# Patient Record
Sex: FEMALE | Race: WHITE | Employment: UNEMPLOYED | URBAN - METROPOLITAN AREA
[De-identification: names, ages, dates, MRNs, and addresses within clinical notes are randomized per-mention and may not be internally consistent; named-entity substitution may affect disease eponyms.]

---

## 2017-11-02 RX ORDER — SERTRALINE HYDROCHLORIDE 100 MG/1
TABLET, FILM COATED ORAL
Qty: 30 TAB | Refills: 0 | Status: SHIPPED | OUTPATIENT
Start: 2017-11-02 | End: 2017-11-08 | Stop reason: CLARIF

## 2017-11-08 ENCOUNTER — OFFICE VISIT (OUTPATIENT)
Dept: FAMILY MEDICINE CLINIC | Age: 32
End: 2017-11-08

## 2017-11-08 VITALS
SYSTOLIC BLOOD PRESSURE: 112 MMHG | HEIGHT: 61 IN | DIASTOLIC BLOOD PRESSURE: 69 MMHG | TEMPERATURE: 98.9 F | HEART RATE: 77 BPM | RESPIRATION RATE: 18 BRPM | BODY MASS INDEX: 23.22 KG/M2 | OXYGEN SATURATION: 98 % | WEIGHT: 123 LBS

## 2017-11-08 DIAGNOSIS — F41.9 ANXIETY: Primary | ICD-10-CM

## 2017-11-08 DIAGNOSIS — R19.7 DIARRHEA, UNSPECIFIED TYPE: ICD-10-CM

## 2017-11-08 RX ORDER — SERTRALINE HYDROCHLORIDE 50 MG/1
50 TABLET, FILM COATED ORAL DAILY
Qty: 15 TAB | Refills: 0 | Status: SHIPPED | OUTPATIENT
Start: 2017-11-08 | End: 2018-01-24

## 2017-11-08 RX ORDER — DULOXETIN HYDROCHLORIDE 30 MG/1
30 CAPSULE, DELAYED RELEASE ORAL DAILY
Qty: 30 CAP | Refills: 1 | Status: SHIPPED | OUTPATIENT
Start: 2017-11-08 | End: 2018-01-24 | Stop reason: SDUPTHER

## 2017-11-08 NOTE — PROGRESS NOTES
Chief Complaint   Patient presents with    Medication Refill     1. Have you been to the ER, urgent care clinic since your last visit? Hospitalized since your last visit? No    2. Have you seen or consulted any other health care providers outside of the [address] [place] [address] since your last visit? Include any pap smears or colon screening.  No

## 2017-11-08 NOTE — LETTER
11/10/2017 8:10 AM 
 
Ms. Jeanna Amin [address] Dear Jeanna Amin: 
 
Please find your most recent results below. Resulted Orders METABOLIC PANEL, COMPREHENSIVE Result Value Ref Range Glucose 77 65 - 99 mg/dL BUN 15 6 - 20 mg/dL Creatinine 1.09 (H) 0.57 - 1.00 mg/dL GFR est non-AA 68 >59 mL/min/1.73 GFR est AA 79 >59 mL/min/1.73  
 BUN/Creatinine ratio 14 9 - 23 Sodium 142 134 - 144 mmol/L Potassium 4.0 3.5 - 5.2 mmol/L Chloride 100 96 - 106 mmol/L  
 CO2 29 18 - 29 mmol/L Calcium 9.0 8.7 - 10.2 mg/dL Protein, total 7.0 6.0 - 8.5 g/dL Albumin 4.7 3.5 - 5.5 g/dL GLOBULIN, TOTAL 2.3 1.5 - 4.5 g/dL A-G Ratio 2.0 1.2 - 2.2 Bilirubin, total 0.2 0.0 - 1.2 mg/dL Alk. phosphatase 38 (L) 39 - 117 IU/L  
 AST (SGOT) 15 0 - 40 IU/L  
 ALT (SGPT) 11 0 - 32 IU/L Narrative Performed at:  [address]  [id number] : Shavonne Baker MD, Phone:  [phone] CBC W/O DIFF Result Value Ref Range WBC 6.1 3.4 - 10.8 x10E3/uL  
 RBC 4.41 3.77 - 5.28 x10E6/uL HGB 13.1 11.1 - 15.9 g/dL HCT 38.8 34.0 - 46.6 % MCV 88 79 - 97 fL  
 MCH 29.7 26.6 - 33.0 pg  
 MCHC 33.8 31.5 - 35.7 g/dL  
 RDW 12.8 12.3 - 15.4 % PLATELET 886 216 - 837 x10E3/uL Narrative Performed at:  [address]  [id number] : Shavonne Baker MD, Phone:  [phone] SED RATE (ESR) Result Value Ref Range Sed rate (ESR) 2 0 - 32 mm/hr Narrative Performed at:  [address]  [id number] : Shavonne Baker MD, Phone:  [phone] TSH 3RD GENERATION Result Value Ref Range TSH 1.190 0.450 - 4.500 uIU/mL Narrative Performed at:  [address]  [id number] : Shavonne Baker MD, Phone:  [phone] HEMOGLOBIN A1C WITH EAG Result Value Ref Range Hemoglobin A1c 5.2 4.8 - 5.6 % Comment:  
            Pre-diabetes: 5.7 - 6.4 Diabetes: >6.4 Glycemic control for adults with diabetes: <7.0 Estimated average glucose 103 mg/dL Narrative Performed at:  [address]  [id number] : Pili Carreno MD, Phone:  [phone] RECOMMENDATIONS: 
 
 
   
Only abnormality is slightly elevated kidney function -- implying that her kidneys are not filtering as well as they should; most commonly seen as a result of hypertension, but patient does not have HTN Would recommend rechecking this lab when patient is well hydrated Normal glucose Normal kidney function Normal liver function No anemia Normal inflammatory marker Normal thyroid function Normal glucose over the last 3 months Needs followup appointment after switch to cymbalta Please call me if you have any questions: [phone] Sincerely, Lady Argenis MD

## 2017-11-08 NOTE — PROGRESS NOTES
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     Had PAP smear done in [place] recently -- thinks it was done September 2017    Is on sertraline for depression -- has been on it since 2015 -- wants to see if another medication may be more helpful    Having trouble with low blood glucose she thinks     States that 2-3 hours after eating that she has shaking, heart beats fast -- blood glucose = 62 one time that she checked it     States that her dad's sister has diabetes    Has had \"stomach issues\" since last delivery -- had abdominal ultrasound, colonoscopy done -- both normal -- sister has IBS and patient has similar sxs    Taking zoloft 100 mg once a day    Also having pain in joints in fingers and other large joints as well    Another problem she stats is waking up during the night and throat is dry -- feels like she gasps for air; able to get air in; patient states that her  states that she snores    Discussed with patient that she had multiple complaints that needed to be addressed; could start with switching her antidepressant to cymbalta, which may be more helpful for her IBS sxs; will need to taper her sertraline and then start back with cymbalta -- gave patient rx for sertraliine 50 mg for two weeks, then to start cymbalta at 30 mg a day    Greater than 50% of this minute visit was counseling about anxiety, depression, possible hypoglycemia, antidepressants and IBS sxs.       Also will check labs today

## 2017-11-09 PROBLEM — K58.1 IRRITABLE BOWEL SYNDROME WITH CONSTIPATION: Status: ACTIVE | Noted: 2017-11-09

## 2017-11-09 LAB
ALBUMIN SERPL-MCNC: 4.7 G/DL (ref 3.5–5.5)
ALBUMIN/GLOB SERPL: 2 {RATIO} (ref 1.2–2.2)
ALP SERPL-CCNC: 38 IU/L (ref 39–117)
ALT SERPL-CCNC: 11 IU/L (ref 0–32)
AST SERPL-CCNC: 15 IU/L (ref 0–40)
BILIRUB SERPL-MCNC: 0.2 MG/DL (ref 0–1.2)
BUN SERPL-MCNC: 15 MG/DL (ref 6–20)
BUN/CREAT SERPL: 14 (ref 9–23)
CALCIUM SERPL-MCNC: 9 MG/DL (ref 8.7–10.2)
CHLORIDE SERPL-SCNC: 100 MMOL/L (ref 96–106)
CO2 SERPL-SCNC: 29 MMOL/L (ref 18–29)
CREAT SERPL-MCNC: 1.09 MG/DL (ref 0.57–1)
ERYTHROCYTE [DISTWIDTH] IN BLOOD BY AUTOMATED COUNT: 12.8 % (ref 12.3–15.4)
ERYTHROCYTE [SEDIMENTATION RATE] IN BLOOD BY WESTERGREN METHOD: 2 MM/HR (ref 0–32)
EST. AVERAGE GLUCOSE BLD GHB EST-MCNC: 103 MG/DL
GFR SERPLBLD CREATININE-BSD FMLA CKD-EPI: 68 ML/MIN/1.73
GFR SERPLBLD CREATININE-BSD FMLA CKD-EPI: 79 ML/MIN/1.73
GLOBULIN SER CALC-MCNC: 2.3 G/DL (ref 1.5–4.5)
GLUCOSE SERPL-MCNC: 77 MG/DL (ref 65–99)
HBA1C MFR BLD: 5.2 % (ref 4.8–5.6)
HCT VFR BLD AUTO: 38.8 % (ref 34–46.6)
HGB BLD-MCNC: 13.1 G/DL (ref 11.1–15.9)
MCH RBC QN AUTO: 29.7 PG (ref 26.6–33)
MCHC RBC AUTO-ENTMCNC: 33.8 G/DL (ref 31.5–35.7)
MCV RBC AUTO: 88 FL (ref 79–97)
PLATELET # BLD AUTO: 255 X10E3/UL (ref 150–379)
POTASSIUM SERPL-SCNC: 4 MMOL/L (ref 3.5–5.2)
PROT SERPL-MCNC: 7 G/DL (ref 6–8.5)
RBC # BLD AUTO: 4.41 X10E6/UL (ref 3.77–5.28)
SODIUM SERPL-SCNC: 142 MMOL/L (ref 134–144)
TSH SERPL DL<=0.005 MIU/L-ACNC: 1.19 UIU/ML (ref 0.45–4.5)
WBC # BLD AUTO: 6.1 X10E3/UL (ref 3.4–10.8)

## 2017-11-09 NOTE — PROGRESS NOTES
Only abnormality is slightly elevated kidney function -- implying that her kidneys are not filtering as well as they should; most commonly seen as a result of hypertension, but patient does not have HTN    Would recommend rechecking this lab when patient is well hydrated     Normal glucose  Normal kidney function  Normal liver function  No anemia  Normal inflammatory marker    Normal thyroid function    Normal glucose over the last 3 months    Needs followup appointment after switch to cymbalta

## 2017-11-28 DIAGNOSIS — R79.89 ELEVATED SERUM CREATININE: Primary | ICD-10-CM

## 2017-12-15 ENCOUNTER — LAB ONLY (OUTPATIENT)
Dept: FAMILY MEDICINE CLINIC | Age: 32
End: 2017-12-15

## 2017-12-15 DIAGNOSIS — R79.89 ABNORMAL LFTS: Primary | ICD-10-CM

## 2017-12-15 NOTE — LETTER
12/21/2017 8:11 AM 
 
Ms. Angela Hernandez [address] Dear Angela Hernandez: 
 
Please find your most recent results below. Resulted Orders METABOLIC PANEL, BASIC Result Value Ref Range Glucose 81 65 - 99 mg/dL BUN 13 6 - 20 mg/dL Creatinine 0.87 0.57 - 1.00 mg/dL GFR est non-AA 90 >59 mL/min/1.73 GFR est  >59 mL/min/1.73  
 BUN/Creatinine ratio 15 9 - 23 Sodium 139 134 - 144 mmol/L Potassium 4.3 3.5 - 5.2 mmol/L Chloride 100 96 - 106 mmol/L  
 CO2 25 18 - 29 mmol/L Calcium 9.9 8.7 - 10.2 mg/dL Narrative Performed at:  [address]  [id number] : Yudy Warren MD, Phone:  [phone] RECOMMENDATIONS: 
 
 
Repeat kidney function test is normal  
 
 
 
 
Please call me if you have any questions: [phone] Sincerely, [facility]

## 2017-12-15 NOTE — MR AVS SNAPSHOT
Visit Information Date & Time Provider Department Dept. Phone Encounter #  
 12/15/2017 11:15 AM [facility] [address] [phone] [id number] Upcoming Health Maintenance Date Due Influenza Age 5 to Adult 8/1/2017 PAP AKA CERVICAL CYTOLOGY 12/26/2017 DTaP/Tdap/Td series (2 - Td) 5/20/2025 Allergies as of 12/15/2017  Review Complete On: 11/9/2017 By: Garfield Waller MD  
 No Known Allergies Current Immunizations  Reviewed on 7/30/2015 Name Date Influenza Vaccine Split 10/11/2012 Tdap 5/20/2015 11:22 AM  
  
 Not reviewed this visit You Were Diagnosed With   
  
 Codes Comments Abnormal LFTs    -  Primary ICD-10-CM: R79.89 ICD-9-CM: 790.6 Vitals OB Status Smoking Status Having regular periods Never Smoker Preferred Pharmacy Pharmacy Name Phone [facility] [address] Your Updated Medication List  
  
   
This list is accurate as of: 12/15/17  4:40 PM.  Always use your most recent med list.  
  
  
  
  
 DULoxetine 30 mg capsule Commonly known as:  CYMBALTA Take 1 Cap by mouth daily. Indications: ANXIETY WITH DEPRESSION  
  
 polyethylene glycol 17 gram packet Commonly known as:  Belkys Hemanth Take 1 Packet by mouth daily. senna-docusate 8.6-50 mg per tablet Commonly known as:  Amelia Lipschutz Take 1 Tab by mouth daily. sertraline 50 mg tablet Commonly known as:  ZOLOFT Take 1 Tab by mouth daily. We Performed the Following METABOLIC PANEL, BASIC [78706 CPT(R)] Introducing [facility]! Dear Roxanna Harper: Thank you for requesting a Hapzing account. Our records indicate that you already have an active Hapzing account. You can access your account anytime at [email or web address] Did you know that you can access your hospital and ER discharge instructions at any time in Dayforce? You can also review all of your test results from your hospital stay or ER visit. Additional Information If you have questions, please visit the Frequently Asked Questions section of the Dayforce website at [email or web address] Remember, Dayforce is NOT to be used for urgent needs. For medical emergencies, dial 911. Now available from your iPhone and Android! Please provide this summary of care documentation to your next provider. Your primary care clinician is listed as [address] [place] [address]. If you have any questions after today's visit, please call [phone].

## 2017-12-16 LAB
BUN SERPL-MCNC: 13 MG/DL (ref 6–20)
BUN/CREAT SERPL: 15 (ref 9–23)
CALCIUM SERPL-MCNC: 9.9 MG/DL (ref 8.7–10.2)
CHLORIDE SERPL-SCNC: 100 MMOL/L (ref 96–106)
CO2 SERPL-SCNC: 25 MMOL/L (ref 18–29)
CREAT SERPL-MCNC: 0.87 MG/DL (ref 0.57–1)
GFR SERPLBLD CREATININE-BSD FMLA CKD-EPI: 103 ML/MIN/1.73
GFR SERPLBLD CREATININE-BSD FMLA CKD-EPI: 90 ML/MIN/1.73
GLUCOSE SERPL-MCNC: 81 MG/DL (ref 65–99)
POTASSIUM SERPL-SCNC: 4.3 MMOL/L (ref 3.5–5.2)
SODIUM SERPL-SCNC: 139 MMOL/L (ref 134–144)

## 2017-12-18 ENCOUNTER — TELEPHONE (OUTPATIENT)
Dept: FAMILY MEDICINE CLINIC | Age: 32
End: 2017-12-18

## 2017-12-18 ENCOUNTER — OFFICE VISIT (OUTPATIENT)
Dept: FAMILY MEDICINE CLINIC | Age: 32
End: 2017-12-18

## 2017-12-18 VITALS
TEMPERATURE: 98.5 F | WEIGHT: 124 LBS | RESPIRATION RATE: 16 BRPM | HEART RATE: 71 BPM | SYSTOLIC BLOOD PRESSURE: 104 MMHG | OXYGEN SATURATION: 99 % | DIASTOLIC BLOOD PRESSURE: 69 MMHG | BODY MASS INDEX: 23.43 KG/M2

## 2017-12-18 DIAGNOSIS — K62.5 BLOOD PER RECTUM: Primary | ICD-10-CM

## 2017-12-18 LAB
HEMOCCULT STL QL: ABNORMAL
HGB BLD-MCNC: 11.8 G/DL
VALID INTERNAL CONTROL?: YES

## 2017-12-18 NOTE — PROGRESS NOTES
Steven Luciano is a 29 y.o. female    1. Have you been to the ER, urgent care clinic since your last visit? Hospitalized since your last visit?no    2. Have you seen or consulted any other health care providers outside of the [address] [place] [address] since your last visit? Include any pap smears or colon screening.  Yes  at . Yue Lopez [address] [place] [address]

## 2017-12-18 NOTE — PATIENT INSTRUCTIONS
You will receive a call from the Gastroenterologist to schedule your appointment to be seen for your hemorrhoids and rectal bleeding. We recommend that you take daily fiber to help with your symptoms of diarrhea. You can purchase metamucil over the counter. Ask your local pharmacy for other options. Follow-up in 2 weeks. Irritable Bowel Syndrome: Care Instructions  Your Care Instructions  Irritable bowel syndrome, or IBS, is a problem with the intestines that causes belly pain, bloating, gas, constipation, and diarrhea. The cause of IBS is not well known. IBS can last for many years, but it does not get worse over time or lead to serious disease. Most people can control their symptoms by changing their diet and reducing stress. Follow-up care is a key part of your treatment and safety. Be sure to make and go to all appointments, and call your doctor if you are having problems. It's also a good idea to know your test results and keep a list of the medicines you take. How can you care for yourself at home? · For constipation:  ¨ Include fruits, vegetables, beans, and whole grains in your diet each day. These foods are high in fiber. ¨ Drink plenty of fluids, enough so that your urine is light yellow or clear like water. If you have kidney, heart, or liver disease and have to limit fluids, talk with your doctor before you increase the amount of fluids you drink. ¨ Get some exercise every day. Build up slowly to 30 to 60 minutes a day on 5 or more days of the week. ¨ Take a fiber supplement, such as Citrucel or Metamucil, every day if needed. Read and follow all instructions on the label. ¨ Schedule time each day for a bowel movement. Having a daily routine may help. Take your time and do not strain when having a bowel movement. · If you often have diarrhea, limit foods and drinks that make it worse.  These are different for each person but may include caffeine (found in coffee, tea, chocolate, and cola drinks), alcohol, fatty foods, gas-producing foods (such as beans, cabbage, and broccoli), some dairy products, and spicy foods. Do not eat candy or gum that contains sorbitol. · Keep a daily diary of what you eat and what symptoms you have. This may help find foods that cause you problems. · Eat slowly. Try to make mealtime relaxing. · Find ways to reduce stress. · Get at least 30 minutes of exercise on most days of the week. Exercise can help reduce tension and prevent constipation. Walking is a good choice. You also may want to do other activities, such as running, swimming, cycling, or playing tennis or team sports. When should you call for help? Call your doctor now or seek immediate medical care if:  ? · Your pain is different than usual or occurs with fever. ? · You lose weight without trying, or you lose your appetite and you do not know why.   ? · Your symptoms often wake you from sleep. ? · Your stools are black and tarlike or have streaks of blood. ? Watch closely for changes in your health, and be sure to contact your doctor if:  ? · Your IBS symptoms get worse or begin to disrupt your day-to-day life. ? · You become more tired than usual.   ? · Your home treatment stops working. Where can you learn more? Go to http://geoffrey-jessy.info/. Enter R460 in the search box to learn more about \"Irritable Bowel Syndrome: Care Instructions. \"  Current as of: May 12, 2017  Content Version: 11.4  © 0285-6075 Maxpanda SaaS Software. Care instructions adapted under license by ONE Change (which disclaims liability or warranty for this information). If you have questions about a medical condition or this instruction, always ask your healthcare professional. [address] any warranty or liability for your use of this information.

## 2017-12-18 NOTE — TELEPHONE ENCOUNTER
Patient called for Dr. Past[facility] nurse to give her advice per her [de-identified] e-mail. .  Advised her to make an appointment and she accepted for today.

## 2017-12-18 NOTE — MR AVS SNAPSHOT
Visit Information Date & Time Provider Department Dept. Phone Encounter #  
 12/18/2017  4:00 PM Hollie Felty, MD [address] [place] [address] [phone] [id number] Follow-up Instructions Return in about 2 weeks (around 1/1/2018) for follow-up. Upcoming Health Maintenance Date Due Influenza Age 5 to Adult 8/1/2017 PAP AKA CERVICAL CYTOLOGY 12/26/2017 DTaP/Tdap/Td series (2 - Td) 5/20/2025 Allergies as of 12/18/2017  Review Complete On: 11/9/2017 By: Maggie Cook MD  
 No Known Allergies Current Immunizations  Reviewed on 7/30/2015 Name Date Influenza Vaccine Split 10/11/2012 Tdap 5/20/2015 11:22 AM  
  
 Not reviewed this visit You Were Diagnosed With   
  
 Codes Comments Blood per rectum    -  Primary ICD-10-CM: K62.5 ICD-9-CM: 569.3 Vitals BP Pulse Temp Resp Weight(growth percentile) SpO2  
 104/69 (BP 1 Location: Right arm, BP Patient Position: Sitting) 71 98.5 °F (36.9 °C) (Oral) 16 124 lb (56.2 kg) 99% BMI OB Status Smoking Status 23.43 kg/m2 Having regular periods Never Smoker BMI and BSA Data Body Mass Index Body Surface Area  
 23.43 kg/m 2 1.56 m 2 Preferred Pharmacy Pharmacy Name Phone [facility] [address] [place] Your Updated Medication List  
  
   
This list is accurate as of: 12/18/17  5:05 PM.  Always use your most recent med list.  
  
  
  
  
 DULoxetine 30 mg capsule Commonly known as:  CYMBALTA Take 1 Cap by mouth daily. Indications: ANXIETY WITH DEPRESSION  
  
 polyethylene glycol 17 gram packet Commonly known as:  Duane [place] Take 1 Packet by mouth daily. senna-docusate 8.6-50 mg per tablet Commonly known as:  Roselinda Chana Take 1 Tab by mouth daily. sertraline 50 mg tablet Commonly known as:  ZOLOFT Take 1 Tab by mouth daily. We Performed the Following AMB POC BLOOD OCCULT QUAL FECAL HEMGLBN 1-3 [id number] CPT(R)] CBC W/O DIFF [40154 CPT(R)] REFERRAL TO GASTROENTEROLOGY [PQS34 Custom] Comments:  
 Colonoscopy for gross rectal bleeding. FOBT positive. Hemorrhoids present but not actively bleeding. Follow-up Instructions Return in about 2 weeks (around 1/1/2018) for follow-up. Referral Information Referral ID Referred By Referred To  
  
 [id number] [address]   
   [address]  [place], [address] [facility] Visits Status Start Date End Date 1 New Request 12/18/17 12/18/18 If your referral has a status of pending review or denied, additional information will be sent to support the outcome of this decision. Patient Instructions You will receive a call from the Gastroenterologist to schedule your appointment to be seen for your hemorrhoids and rectal bleeding. We recommend that you take daily fiber to help with your symptoms of diarrhea. You can purchase metamucil over the counter. Ask your local pharmacy for other options. Follow-up in 2 weeks. Irritable Bowel Syndrome: Care Instructions Your Care Instructions Irritable bowel syndrome, or IBS, is a problem with the intestines that causes belly pain, bloating, gas, constipation, and diarrhea. The cause of IBS is not well known. IBS can last for many years, but it does not get worse over time or lead to serious disease. Most people can control their symptoms by changing their diet and reducing stress. Follow-up care is a key part of your treatment and safety. Be sure to make and go to all appointments, and call your doctor if you are having problems. It's also a good idea to know your test results and keep a list of the medicines you take. How can you care for yourself at home?  
· For constipation: 
¨ Include fruits, vegetables, beans, and whole grains in your diet each day. These foods are high in fiber. ¨ Drink plenty of fluids, enough so that your urine is light yellow or clear like water. If you have kidney, heart, or liver disease and have to limit fluids, talk with your doctor before you increase the amount of fluids you drink. ¨ Get some exercise every day. Build up slowly to 30 to 60 minutes a day on 5 or more days of the week. ¨ Take a fiber supplement, such as Citrucel or Metamucil, every day if needed. Read and follow all instructions on the label. ¨ Schedule time each day for a bowel movement. Having a daily routine may help. Take your time and do not strain when having a bowel movement. · If you often have diarrhea, limit foods and drinks that make it worse. These are different for each person but may include caffeine (found in coffee, tea, chocolate, and cola drinks), alcohol, fatty foods, gas-producing foods (such as beans, cabbage, and broccoli), some dairy products, and spicy foods. Do not eat candy or gum that contains sorbitol. · Keep a daily diary of what you eat and what symptoms you have. This may help find foods that cause you problems. · Eat slowly. Try to make mealtime relaxing. · Find ways to reduce stress. · Get at least 30 minutes of exercise on most days of the week. Exercise can help reduce tension and prevent constipation. Walking is a good choice. You also may want to do other activities, such as running, swimming, cycling, or playing tennis or team sports. When should you call for help? Call your doctor now or seek immediate medical care if: 
? · Your pain is different than usual or occurs with fever. ? · You lose weight without trying, or you lose your appetite and you do not know why.  
? · Your symptoms often wake you from sleep. ? · Your stools are black and tarlike or have streaks of blood. ? Watch closely for changes in your health, and be sure to contact your doctor if: ? · Your IBS symptoms get worse or begin to disrupt your day-to-day life. ? · You become more tired than usual.  
? · Your home treatment stops working. Where can you learn more? Go to http://geoffrey-jessy.info/. Enter C889 in the search box to learn more about \"Irritable Bowel Syndrome: Care Instructions. \" Current as of: May 12, 2017 Content Version: 11.4 © 2578-0489 YOOWALK. Care instructions adapted under license by Apportable (which disclaims liability or warranty for this information). If you have questions about a medical condition or this instruction, always ask your healthcare professional. [place] [address] any warranty or liability for your use of this information. Introducing [facility]! Dear Yanely Barron: Thank you for requesting a V[employer] account. Our records indicate that you already have an active [employer] account. You can access your account anytime at [email or web address] Did you know that you can access your hospital and ER discharge instructions at any time in V[employer]ome [employer]? You can also review all of your test results from your hospital stay or ER visit. Additional Information If you have questions, please visit the Frequently Asked Questions section of the [employer] website at [email or web address] Remember, V[employer] is NOT to be used for urgent needs. For medical emergencies, dial 911. Now available from your iPhone and Android! Please provide this summary of care documentation to your next provider. Your primary care clinician is listed as [address] [place] [address]. If you have any questions after today's visit, please call [phone].

## 2017-12-18 NOTE — PROGRESS NOTES
I reviewed with the resident the medical history and the resident's findings on the physical examination. I discussed with the resident the patient's diagnosis and concur with the plan.   Follow up HGB

## 2017-12-18 NOTE — PROGRESS NOTES
Subjective  CC: Yeni Abbasi is an 29 y.o. female presents for rectal bleeding and abdominal cramping. Has self-diagnosed IBS since 2 years ago. Predominately dirrheal type. Had colonoscopy done in [place] with out-of network about 1-2 years ago. She believes it was normal. She also had an ultrasound out in [facility] 139 for nausea. She has had hemorrhoids previously. But Thursday she noticed increase in blood. She initially thought it was an episode similar to what she had in May but the blood only lasted for about a day before it improved. At that time it was self-limiting. She has been going to the bathroom about 5 time a day which is her normal with IBS. Stool is usually watery with occasional really soft, small caliber stools. She sees blood when she wipes but now she is noting blood when she has flatus that is present in toilet bowl (present in picture on phone)    She is also having some lower abdominal cramping. It usually occurs before she has to go to the bathroom. Denies nausea or vomiting. No fevers. No changes in eating. Denies fatigue, dizziness, SOB, or chest pain. Allergies - reviewed:   No Known Allergies      Medications - reviewed:   Current Outpatient Prescriptions   Medication Sig    DULoxetine (CYMBALTA) 30 mg capsule Take 1 Cap by mouth daily. Indications: ANXIETY WITH DEPRESSION    sertraline (ZOLOFT) 50 mg tablet Take 1 Tab by mouth daily.  senna-docusate (PERICOLACE) 8.6-50 mg per tablet Take 1 Tab by mouth daily.  polyethylene glycol (MIRALAX) 17 gram packet Take 1 Packet by mouth daily. No current facility-administered medications for this visit.           Past Medical History - reviewed:  Past Medical History:   Diagnosis Date    Infertility     Psychiatric problem     depression on meds now, anorexia in past         Immunizations - reviewed:   Immunization History   Administered Date(s) Administered    Influenza Vaccine Split 10/11/2012    Tdap 05/20/2015 ROS  Review of Systems : A complete review of systems was performed and is negative except for those mentioned in the HPI. Physical Exam  Visit Vitals    /69 (BP 1 Location: Right arm, BP Patient Position: Sitting)    Pulse 71    Temp 98.5 °F (36.9 °C) (Oral)    Resp 16    Wt 124 lb (56.2 kg)    SpO2 99%    BMI 23.43 kg/m2       General appearance - Alert, NAD. Head: Atraumatic. Normocephalic. No lymphadenopathy  Eyes: EOMI. Sclera white. Respiratory - LCTAB. No wheeze/rale/rhonchi  Heart - Normal rate, regular rhythm. No m/r/r  Abdomen - Soft, mild tenderness to palpation of right lower quadrant. Non distended. Active bowel sounds. Neurological - No focal deficits. Speech normal.   Musculoskeletal - Normal ROM, Gait normal.    Extremities - No LE edema. Distal pulses intact  Skin - normal coloration, no palor and normal turgor. No cyanosis, no rash. Rectum - present of 2 hemorrhoids, one significantly larger than the other. Nontender. No visible blood present. No internal hemorrhoid present on rectal exam.     Labs  POC hgb - 11.8  FOBT - positive      Assessment/Plan  1. Blood per rectum - Concerns include IBD vs internal hemorrhoid vs bleeding colon polyp. FOBT was positive for blood although non visible at anus. Lab is currently closed so AMB POC Hbg ordered was 11.8 with the last Hbg on 11/8/17 to be 13.1. Pt is not showing signs of symptomatic anemia and is not tachycardiac on exam today. Bleeding could be from internal hemorrhoids as patient does strain at times with IBS. Discussed warning signs when patient should proceed to ER with rectal bleeding including fatigue, dizziness SOB, pre-syncope or if bleeding worsens.   - CBC W/O DIFF  - AMB POC BLOOD OCCULT QUAL FECAL HEMGLBN 1-3  - REFERRAL TO GASTROENTEROLOGY  - AMB POC HEMOGLOBIN (HGB)  - Start daily fiber supplementation to help with IBS    Follow-up Disposition:  Return in about 2 weeks (around 1/1/2018) for follow-up. Or sooner if needed      I have discussed the aforementioned diagnoses and plan with the patient in detail. I have provided information in person and/or in AVS. All questions answered prior to discharge.     Vitor Chen MD  Family Medicine Resident  PGY 1

## 2018-01-25 ENCOUNTER — TELEPHONE (OUTPATIENT)
Dept: FAMILY MEDICINE CLINIC | Age: 33
End: 2018-01-25

## 2018-01-25 DIAGNOSIS — F41.8 DEPRESSION WITH ANXIETY: Primary | ICD-10-CM

## 2018-01-25 RX ORDER — SERTRALINE HYDROCHLORIDE 50 MG/1
50 TABLET, FILM COATED ORAL DAILY
Qty: 30 TAB | Refills: 3 | Status: SHIPPED | OUTPATIENT
Start: 2018-01-25 | End: 2018-02-06 | Stop reason: SDUPTHER

## 2018-01-25 NOTE — TELEPHONE ENCOUNTER
Patient calling for follow up to [employer] message that she sent 1/21/18    Per nurse Simeon Tamez, she will have MD review and patient will be contacted today    Conversation: Prescription Question   (Oldest Message First)   Janice Osullivan   to Alisson Clements MD           1/21/18 2:55 Michell Worrell. im really wanting to switch back to the antidepressant I was on before cymbalta. Since being on cymbalta I've had some of the depression return and my family is starting to notice a difference in me. I know we had quit sertraline due to stomach issues but I'm going to a GI doctor now for that, and looks like it IBS. Any way I'd rather have gut issues then mind issues! Also, I've had terrible ringing in my ear! Is that a side effect from cymbalta? If there's any way I could get back on sertraline soon would be good.  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 on a trip to [place] to in a couple of weeks, so it'd be nice to have this behind me if possible!! Thankyou so much

## 2018-01-26 NOTE — TELEPHONE ENCOUNTER
Called patient and informed her of medication switch per Dr. Marguerite Marc. Patient verbalized understanding.

## 2018-02-06 DIAGNOSIS — F41.8 DEPRESSION WITH ANXIETY: Primary | ICD-10-CM

## 2018-02-06 RX ORDER — SERTRALINE HYDROCHLORIDE 100 MG/1
100 TABLET, FILM COATED ORAL DAILY
Qty: 30 TAB | Refills: 6 | Status: SHIPPED | OUTPATIENT
Start: 2018-02-06 | End: 2018-11-26 | Stop reason: SDUPTHER

## 2018-10-03 ENCOUNTER — OFFICE VISIT (OUTPATIENT)
Dept: FAMILY MEDICINE CLINIC | Age: 33
End: 2018-10-03

## 2018-10-03 VITALS
RESPIRATION RATE: 16 BRPM | HEIGHT: 61 IN | TEMPERATURE: 98.5 F | HEART RATE: 73 BPM | WEIGHT: 131 LBS | DIASTOLIC BLOOD PRESSURE: 58 MMHG | SYSTOLIC BLOOD PRESSURE: 92 MMHG | OXYGEN SATURATION: 100 % | BODY MASS INDEX: 24.73 KG/M2

## 2018-10-03 DIAGNOSIS — N92.6 MISSED MENSES: Primary | ICD-10-CM

## 2018-10-03 DIAGNOSIS — Z34.80 SUPERVISION OF OTHER NORMAL PREGNANCY, ANTEPARTUM: ICD-10-CM

## 2018-10-03 LAB
HCG URINE, QL. (POC): POSITIVE
VALID INTERNAL CONTROL?: YES

## 2018-10-03 RX ORDER — PROMETHAZINE HYDROCHLORIDE 12.5 MG/1
12.5 TABLET ORAL
Qty: 30 TAB | Refills: 3 | Status: SHIPPED | OUTPATIENT
Start: 2018-10-03 | End: 2018-10-19 | Stop reason: RX

## 2018-10-03 NOTE — PROGRESS NOTES
Subjective:   Tim Duval is a 34 y.o. female who is being seen today for possible pregnancy due to missed menses. LMP 18   She is sexually active with  and is not using contraception. [id number]  Hx : yes with first,  with last 2  Hx complications in prior pregnancies: no  Taking PNV: yes  Regular periods/certain LMP: yes  Very sick, nauseated constantly. Vomiting 1-3 times per day. Was like this with previous pregnancies. Has tried Zofran she had and       Allergies- reviewed:   No Known Allergies      Medications- reviewed:   Current Outpatient Prescriptions   Medication Sig    CALCIUM-MAGNESIUM PO Take  by mouth.  PNV No12-Iron-FA-DSS-OM-3 29 mg iron-1 mg -50 mg CPKD Take  by mouth.  promethazine (PHENERGAN) 12.5 mg tablet Take 1 Tab by mouth every six (6) hours as needed for Nausea.  sertraline (ZOLOFT) 100 mg tablet Take 1 Tab by mouth daily.  senna-docusate (PERICOLACE) 8.6-50 mg per tablet Take 1 Tab by mouth daily.  polyethylene glycol (MIRALAX) 17 gram packet Take 1 Packet by mouth daily. No current facility-administered medications for this visit. Past Medical History- reviewed:  Past Medical History:   Diagnosis Date    IBS (irritable bowel syndrome)     Infertility     Psychiatric problem     depression on meds now, anorexia in past       Past Surgical History- reviewed:   Past Surgical History:   Procedure Laterality Date     DELIVERY ONLY       for macrosomia(-)    HX HEMORRHOIDECTOMY           Social History- reviewed:  Social History     Social History    Marital status:      Spouse name: N/A    Number of children: N/A    Years of education: N/A     Occupational History    Not on file.      Social History Main Topics    Smoking status: Never Smoker    Smokeless tobacco: Never Used    Alcohol use No    Drug use: No    Sexual activity: Yes     Partners: Male     Birth control/ protection: None     Other Topics Concern    Not on file     Social History Narrative         Immunizations- reviewed:   Immunization History   Administered Date(s) Administered    Influenza Vaccine Split 10/11/2012    Tdap 2015     ROS:  GI: No abdominal pain, nausea, vomiting  : No vaginal bleeding, some sharp low pelvic stabbing pain but no cramping      Objective:     Visit Vitals    BP 92/58 (BP 1 Location: Left arm, BP Patient Position: Sitting)    Pulse 73    Temp 98.5 °F (36.9 °C) (Oral)    Resp 16    Ht 5' 1\" (1.549 m)    Wt 131 lb (59.4 kg)    LMP 2018 (Approximate)    SpO2 100%    BMI 24.75 kg/m2       Physical Exam:  GENERAL APPEARANCE: alert, well appearing, in no apparent distress  PSYCH: normal mood and affect    Labs:  Urine pregnancy test pos   Recent Results (from the past 12 hour(s))   AMB POC URINE PREGNANCY TEST, VISUAL COLOR COMPARISON    Collection Time: 10/03/18  3:10 PM   Result Value Ref Range    VALID INTERNAL CONTROL POC Yes     HCG urine, Ql. (POC) Positive Negative         Assessment:       ICD-10-CM ICD-9-CM    1. Missed menses N92.6 626.4 AMB POC URINE PREGNANCY TEST, VISUAL COLOR COMPARISON   2. Supervision of other normal pregnancy, antepartum Z34.80 V22.1 promethazine (PHENERGAN) 12.5 mg tablet         Plan:   34yo  @ 8.0 by LMP  Pos pregnancy test  Phenergan for n/v   F/u for IOB with dating ultrasound   Hx of section with 2 subsequent successful       Orders Placed This Encounter    AMB POC URINE PREGNANCY TEST, VISUAL COLOR COMPARISON    CALCIUM-MAGNESIUM PO     Sig: Take  by mouth.  PNV No12-Iron-FA-DSS-OM-3 29 mg iron-1 mg -50 mg CPKD     Sig: Take  by mouth.  promethazine (PHENERGAN) 12.5 mg tablet     Sig: Take 1 Tab by mouth every six (6) hours as needed for Nausea. Dispense:  30 Tab     Refill:  3         I have discussed the diagnosis with the patient and the intended plan as seen in the above orders.   The patient has received an after-visit summary and questions were answered concerning future plans. I have discussed medication side effects and warnings with the patient as well. Informed pt to return to the office or go to the ER if she experiences vaginal bleeding, vaginal discharge, leaking of fluid, pelvic cramping.       Jesse Wu, DO

## 2018-10-03 NOTE — PROGRESS NOTES
Chief Complaint   Patient presents with    Missed Menses     1. Have you been to the ER, urgent care clinic since your last visit? Hospitalized since your last visit? No    2. Have you seen or consulted any other health care providers outside of the [address] [place] [address] since your last visit? Include any pap smears or colon screening.  No

## 2018-10-19 ENCOUNTER — HOSPITAL ENCOUNTER (OUTPATIENT)
Dept: LAB | Age: 33
Discharge: HOME | End: 2018-10-19
Payer: SELF-PAY

## 2018-10-19 ENCOUNTER — ROUTINE PRENATAL (OUTPATIENT)
Dept: FAMILY MEDICINE CLINIC | Age: 33
End: 2018-10-19

## 2018-10-19 VITALS
WEIGHT: 134.8 LBS | DIASTOLIC BLOOD PRESSURE: 56 MMHG | BODY MASS INDEX: 25.45 KG/M2 | OXYGEN SATURATION: 99 % | HEIGHT: 61 IN | SYSTOLIC BLOOD PRESSURE: 88 MMHG | RESPIRATION RATE: 17 BRPM | TEMPERATURE: 98.1 F | HEART RATE: 76 BPM

## 2018-10-19 DIAGNOSIS — Z34.91 PRENATAL CARE IN FIRST TRIMESTER: Primary | ICD-10-CM

## 2018-10-19 LAB
ANTIBODY SCREEN, EXTERNAL: NEGATIVE
BILIRUB UR QL STRIP: NEGATIVE
CHLAMYDIA, EXTERNAL: NEGATIVE
GLUCOSE UR-MCNC: NEGATIVE MG/DL
GRBS, EXTERNAL: NORMAL
HBSAG, EXTERNAL: NEGATIVE
HIV, EXTERNAL: NONREACTIVE
KETONES P FAST UR STRIP-MCNC: NEGATIVE MG/DL
N. GONORRHEA, EXTERNAL: NEGATIVE
PH UR STRIP: 7 [PH] (ref 4.6–8)
PROT UR QL STRIP: NEGATIVE
RUBELLA, EXTERNAL: NORMAL
SP GR UR STRIP: 1.02 (ref 1–1.03)
T. PALLIDUM, EXTERNAL: NEGATIVE
TYPE, ABO & RH, EXTERNAL: NORMAL
UA UROBILINOGEN AMB POC: NORMAL (ref 0.2–1)
URINALYSIS CLARITY POC: CLEAR
URINALYSIS COLOR POC: YELLOW
URINE BLOOD POC: NEGATIVE
URINE LEUKOCYTES POC: NEGATIVE
URINE NITRITES POC: NEGATIVE

## 2018-10-19 PROCEDURE — 87491 CHLMYD TRACH DNA AMP PROBE: CPT | Performed by: STUDENT IN AN ORGANIZED HEALTH CARE EDUCATION/TRAINING PROGRAM

## 2018-10-19 PROCEDURE — 87624 HPV HI-RISK TYP POOLED RSLT: CPT | Performed by: STUDENT IN AN ORGANIZED HEALTH CARE EDUCATION/TRAINING PROGRAM

## 2018-10-19 PROCEDURE — 88175 CYTOPATH C/V AUTO FLUID REDO: CPT | Performed by: STUDENT IN AN ORGANIZED HEALTH CARE EDUCATION/TRAINING PROGRAM

## 2018-10-19 RX ORDER — PROMETHAZINE HYDROCHLORIDE 12.5 MG/1
12.5 SUPPOSITORY RECTAL
Qty: 12 SUPPOSITORY | Refills: 3 | Status: SHIPPED | OUTPATIENT
Start: 2018-10-19 | End: 2018-10-22 | Stop reason: ALTCHOICE

## 2018-10-19 NOTE — PROGRESS NOTES
Chief Complaint   Patient presents with    Initial Prenatal Visit     1. Have you been to the ER, urgent care clinic since your last visit? Hospitalized since your last visit? No    2. Have you seen or consulted any other health care providers outside of the [facility] since your last visit? Include any pap smears or colon screening.  No      DAVID: 5/15/2019    LMP: 2018        First one was a c section all other were vaginal deliveries- no complications with any of them--full term with all children    Declined flu vaccine

## 2018-10-19 NOTE — PROGRESS NOTES
OB Dating Ultrasound    Patient is a 34 y.o.  with an estimated gestational age of 8w4d by LMP who presents for dating ultrasound. [facility]  OFFICE PROCEDURE PROGRESS NOTE    Chart reviewed for the following:   Jesse HUNTER DO , have reviewed the History, Physical and updated the Allergic reactions for [address] performed immediately prior to start of procedure:   Jesse HUNTER DO , have performed the following reviews on [facility] prior to the start of the procedure:            * Patient was identified by name and date of birth   * Agreement on procedure being performed was verified  * Risks and Benefits explained to the patient  * Procedure site verified and marked as necessary  * Patient was positioned for comfort  * Consent was signed and verified     Time: 10:55  Date of procedure: 10/19/2018  Procedure performed by: Jesse Wu DO  How tolerated by patient: tolerated the procedure well with no complications    Ultrasound type:  Transabdominal  Findings: single IUP with +cardiac activity, fetus active. Placenta location: posterior  Fluid: grossly normal     GA by LMP: 10w2d  GA by AUA: 11w2d    DAVID by ultrasound today is consistent with DAVID by LMP.  Keep DAVID to: 05/15/2019    Escobar Henley MD

## 2018-10-20 ENCOUNTER — TELEPHONE (OUTPATIENT)
Dept: FAMILY MEDICINE CLINIC | Age: 33
End: 2018-10-20

## 2018-10-20 NOTE — PROGRESS NOTES
Return OB Visit       Subjective:   Mervat Hernandez 34 y.o.   DAVID: 5/15/2019, by Last Menstrual Period that correlates with 10w2d ultrasound  GA:  10w2d. Pregnancy complicated by history of  section. States she does not have abdominal pain  , chest pain, contractions and headache . She continues to present with nausea and vomiting, Unisom and B6 were previously prescribed but have not resolved the symptoms. Diet: well balanced, healthy   Water intake: adequate   Prenatal Vitamins: taking      She denies vaginal bleeding, discharge or loss of fluid. She denies nausea, vomiting, severe abdominal pain or cramping. She denies dysuria. She denies headaches, dizziness or vision changes. She denies excessive swelling of extremities. Past Medical History - Reviewed today  Patient Active Problem List   Diagnosis Code    Anorexia R63.0    Depression with anxiety F41.8    Urinary urgency R39.15    Polydipsia R63.1    Varicose veins of both lower extremities I83.93    Irritable bowel syndrome with constipation K58.1         Medications - Reviewed today  Current Outpatient Medications   Medication Sig Dispense Refill    promethazine (PHENERGAN) 12.5 mg suppository Insert 1 Suppository into rectum every six (6) hours as needed for Nausea for up to 7 days. 12 Suppository 3    CALCIUM-MAGNESIUM PO Take  by mouth.  PNV No12-Iron-FA-DSS-OM-3 29 mg iron-1 mg -50 mg CPKD Take  by mouth.  sertraline (ZOLOFT) 100 mg tablet Take 1 Tab by mouth daily. 30 Tab 6    senna-docusate (PERICOLACE) 8.6-50 mg per tablet Take 1 Tab by mouth daily. 30 Tab 1    polyethylene glycol (MIRALAX) 17 gram packet Take 1 Packet by mouth daily.  30 Packet 3         Allergies - Reviewed today  No Known Allergies      Family History - Reviewed today  Family History   Problem Relation Age of Onset    Asthma Brother     Hypertension Maternal Grandfather          Social History - Reviewed today  Social History Socioeconomic History    Marital status:      Spouse name: Not on file    Number of children: Not on file    Years of education: Not on file    Highest education level: Not on file   Social Needs    Financial resource strain: Not on file    Food insecurity - worry: Not on file    Food insecurity - inability: Not on file    Transportation needs - medical: Not on file   [employer] needs - non-medical: Not on file   Occupational History    Not on file   Tobacco Use    Smoking status: Never Smoker    Smokeless tobacco: Never Used   Substance and Sexual Activity    Alcohol use: No    Drug use: No    Sexual activity: Yes     Partners: Male     Birth control/protection: None   Other Topics Concern    Not on file   Social History Narrative    Not on file         Health Maintenance - Reviewed today   Immunizations:     -Influenza: last one administered on 10/11/2012     -Tdap:  in 3rd trimester     Screening:     -Pap smear: due      Objective:     Visit Vitals  BP (!) 88/56   Pulse 76   Temp 98.1 °F (36.7 °C) (Oral)   Resp 17   Ht 5' 1\" (1.549 m)   Wt 134 lb 12.8 oz (61.1 kg)   LMP 08/08/2018 (Approximate)   SpO2 99%   BMI 25.47 kg/m²       Physical Exam:  GENERAL APPEARANCE: alert, well appearing, in no apparent distress  LUNGS: clear to auscultation, no wheezes, rales or rhonchi, symmetric air entry  HEART: regular rate and rhythm, no murmurs  ABDOMEN: FHT present on ultrasound  BACK: no CVA tenderness  EXTREMITIES: no redness or tenderness in the calves or thighs, no edema  NEUROLOGICAL: alert, oriented, normal speech, no focal findings or movement disorder noted  PELVIC: normal external genitalia, vulva, vagina, cervix, uterus and adnexa. (Chaperoned by R[facility])      Assessment         ICD-10-CM ICD-9-CM    1.  Prenatal care in first trimester Z34.91 V22.1 AMB POC URINALYSIS DIP STICK AUTO W/O MICRO      CBC WITH AUTOMATED DIFF      BLOOD TYPE, (ABO+RH)      ANTIBODY SCREEN      PAP IG,CT-NG, APTIMA HPV AND RFX 16/18,45 ([id number])      RUBELLA AB, IGG      T PALLIDUM AB      HIV 1/2 AG/AB, 4TH GENERATION,W RFLX CONFIRM      CULTURE, URINE      HEP B SURFACE AG      CANCELED: PRENATAL PANEL I/WITHOUT HBSAG   This is a 33 y/o  at 10w2d by LMP that correlates with 10w2d ultrasound DAVID: 5/15/2019 with hx of previous  section followed by two subsequent successful        Plan     1. Hyperemesis gravidarum: Persistent N/V despite B6 and Unisom. - promethazine (PHENEGRAN) 12.5 mg suppository q6 prn  -Will not prescribe phenegran tablet given increased risk of serotonin syndrome given patient is on zoloft. 2. SIUP at  [facility]   - U/A WNL  - Prenatal labs ordered  -PAP smear performed  - 2nd trimester screen for Down's Syndrome discussed  - First trimester dating ultrasound performed. - Ultrasound for fetal Anomalies form faxed. - Follow up in 4 weeks  -NEEDS FLU VACCINE    Orders Placed This Encounter    CULTURE, URINE    CBC WITH AUTOMATED DIFF    RUBELLA AB, IGG    T PALLIDUM AB    HIV 1/2 AG/AB, 4TH GENERATION,W RFLX CONFIRM    HEP B SURFACE AG    AMB POC URINALYSIS DIP STICK AUTO W/O MICRO    BLOOD TYPE, (ABO+RH)    ANTIBODY SCREEN    promethazine (PHENERGAN) 12.5 mg suppository     Sig: Insert 1 Suppository into rectum every six (6) hours as needed for Nausea for up to 7 days. Dispense:  12 Suppository     Refill:  3    PAP IG,CT-NG, APTIMA HPV AND RFX 85/80,96 ([id number])     Order Specific Question:   Pap Source? Answer:   Endocervical     Order Specific Question:   Total Hysterectomy? Answer:   No     Order Specific Question:   Supracervical Hysterectomy? Answer:   No     Order Specific Question:   Post Menopausal?     Answer:   No     Order Specific Question:   Hormone Therapy? Answer:   No     Order Specific Question:   IUD? Answer:   No     Order Specific Question:   Abnormal Bleeding?      Answer:   No     Order Specific Question:   Pregnant     Answer:   Yes     Order Specific Question:   Post Partum? Answer:   No       I have discussed the diagnosis with the patient and the intended plan as seen in the above orders. The patient has received an after-visit summary and questions were answered concerning future plans. I have discussed medication side effects and warnings with the patient as well.     Patient examined by Dr. Carley Richey MD  Family Medicine Resident

## 2018-10-20 NOTE — TELEPHONE ENCOUNTER
Called patient and informed her Dr. Raul Desir sent in nausea medication to pharmacy. Advised her if it does not work to call back and let us know. Patient verbalized understanding.

## 2018-10-22 ENCOUNTER — TELEPHONE (OUTPATIENT)
Dept: FAMILY MEDICINE CLINIC | Age: 33
End: 2018-10-22

## 2018-10-22 DIAGNOSIS — O21.9 NAUSEA/VOMITING IN PREGNANCY: Primary | ICD-10-CM

## 2018-10-22 DIAGNOSIS — O23.40 GROUP B STREPTOCOCCUS URINARY TRACT INFECTION AFFECTING PREGNANCY, ANTEPARTUM: Primary | ICD-10-CM

## 2018-10-22 DIAGNOSIS — B95.1 GROUP B STREPTOCOCCUS URINARY TRACT INFECTION AFFECTING PREGNANCY, ANTEPARTUM: Primary | ICD-10-CM

## 2018-10-22 LAB
BACTERIA UR CULT: ABNORMAL
BACTERIA UR CULT: ABNORMAL

## 2018-10-22 RX ORDER — MECLIZINE HCL 12.5 MG 12.5 MG/1
12.5 TABLET ORAL
Qty: 30 TAB | Refills: 3 | Status: SHIPPED | OUTPATIENT
Start: 2018-10-22 | End: 2018-11-01

## 2018-10-22 RX ORDER — AMOXICILLIN 500 MG/1
500 CAPSULE ORAL 2 TIMES DAILY
Qty: 14 CAP | Refills: 0 | Status: SHIPPED | OUTPATIENT
Start: 2018-10-22 | End: 2018-10-29

## 2018-10-22 NOTE — TELEPHONE ENCOUNTER
Called patient and informed her of antibiotics sent to pharmacy for UTI. Patient verbalized understanding.

## 2018-10-22 NOTE — TELEPHONE ENCOUNTER
----- Message from Gisella Ward DO sent at 10/22/2018 10:58 AM EDT -----  Will send in abx for GBS UTI in pregnancy. [facility], please inform patient.

## 2018-10-23 LAB
ABO GROUP BLD: NORMAL
BASOPHILS # BLD AUTO: 0 X10E3/UL (ref 0–0.2)
BASOPHILS NFR BLD AUTO: 0 %
BLD GP AB SCN SERPL QL: NEGATIVE
C TRACH RRNA SPEC QL NAA+PROBE: NEGATIVE
EOSINOPHIL # BLD AUTO: 0.2 X10E3/UL (ref 0–0.4)
EOSINOPHIL NFR BLD AUTO: 2 %
ERYTHROCYTE [DISTWIDTH] IN BLOOD BY AUTOMATED COUNT: 12.9 % (ref 12.3–15.4)
HBV SURFACE AG SERPL QL IA: NEGATIVE
HCT VFR BLD AUTO: 38.8 % (ref 34–46.6)
HGB BLD-MCNC: 12.7 G/DL (ref 11.1–15.9)
HIV 1+2 AB+HIV1 P24 AG SERPL QL IA: NON REACTIVE
IMM GRANULOCYTES # BLD: 0.1 X10E3/UL (ref 0–0.1)
IMM GRANULOCYTES NFR BLD: 1 %
LYMPHOCYTES # BLD AUTO: 1.9 X10E3/UL (ref 0.7–3.1)
LYMPHOCYTES NFR BLD AUTO: 18 %
MCH RBC QN AUTO: 29.6 PG (ref 26.6–33)
MCHC RBC AUTO-ENTMCNC: 32.7 G/DL (ref 31.5–35.7)
MCV RBC AUTO: 90 FL (ref 79–97)
MONOCYTES # BLD AUTO: 0.8 X10E3/UL (ref 0.1–0.9)
MONOCYTES NFR BLD AUTO: 7 %
N GONORRHOEA RRNA SPEC QL NAA+PROBE: NEGATIVE
NEUTROPHILS # BLD AUTO: 7.9 X10E3/UL (ref 1.4–7)
NEUTROPHILS NFR BLD AUTO: 72 %
PLATELET # BLD AUTO: 274 X10E3/UL (ref 150–379)
RBC # BLD AUTO: 4.29 X10E6/UL (ref 3.77–5.28)
RH BLD: POSITIVE
RUBV IGG SERPL IA-ACNC: 6.92 INDEX
SPECIMEN SOURCE: NORMAL
T PALLIDUM AB SER QL IA: NEGATIVE
WBC # BLD AUTO: 10.8 X10E3/UL (ref 3.4–10.8)

## 2018-10-23 NOTE — PROGRESS NOTES
I saw and evaluated the patient, performing the key elements of the service. I discussed the findings, assessment and plan with the resident and agree with the resident's findings and plan as documented in the resident's note. 32yo [id number] @ 10.2 by LMP=10.2 wk sono   1. IUP: IOB labs with pap this visit, dating ultrasound consistent with dates (exactly 7 days off, not greater than, will correlate with MFM anatomy scan), declines aneuploidy screening this pregnancy, need to fill out episode history in full at next visit. 2.  NVP: has tried Zofran ODT, promethazine PO, B6 and Unisom without luck. Still vomiting 2-3 times most days, but is gaining weight, does not appear hypovolemic. Trial of phenergan suppository. Cannot do Reglan due to interaction with Zoloft. 3.  Hx section with 2 subsequent : plan will be for TOLAC this pregnancy. 4.  Depression: stable on Zoloft 100mg daily, on this for a long time including previous pregnancies. Recommend close f/u PP for depression screening.

## 2018-10-25 LAB
SPECIMEN STATUS REPORT, ROLRST: NORMAL
VZV IGG SER IA-ACNC: 186 INDEX

## 2018-11-21 ENCOUNTER — ROUTINE PRENATAL (OUTPATIENT)
Dept: FAMILY MEDICINE CLINIC | Age: 33
End: 2018-11-21

## 2018-11-21 VITALS
OXYGEN SATURATION: 99 % | SYSTOLIC BLOOD PRESSURE: 94 MMHG | HEIGHT: 61 IN | TEMPERATURE: 98.5 F | WEIGHT: 146 LBS | RESPIRATION RATE: 16 BRPM | DIASTOLIC BLOOD PRESSURE: 57 MMHG | HEART RATE: 82 BPM | BODY MASS INDEX: 27.56 KG/M2

## 2018-11-21 DIAGNOSIS — F41.9 ANXIETY AND DEPRESSION: ICD-10-CM

## 2018-11-21 DIAGNOSIS — O26.892 PREGNANCY HEADACHE IN SECOND TRIMESTER: ICD-10-CM

## 2018-11-21 DIAGNOSIS — Z3A.15 15 WEEKS GESTATION OF PREGNANCY: Primary | ICD-10-CM

## 2018-11-21 DIAGNOSIS — R51.9 PREGNANCY HEADACHE IN SECOND TRIMESTER: ICD-10-CM

## 2018-11-21 DIAGNOSIS — F32.A ANXIETY AND DEPRESSION: ICD-10-CM

## 2018-11-21 DIAGNOSIS — R82.71 GBS BACTERIURIA: ICD-10-CM

## 2018-11-21 DIAGNOSIS — Z98.891 HISTORY OF VAGINAL BIRTH AFTER CESAREAN: ICD-10-CM

## 2018-11-21 LAB
BILIRUB UR QL STRIP: NEGATIVE
GLUCOSE UR-MCNC: NEGATIVE MG/DL
KETONES P FAST UR STRIP-MCNC: NEGATIVE MG/DL
PH UR STRIP: 7 [PH] (ref 4.6–8)
PROT UR QL STRIP: NEGATIVE
SP GR UR STRIP: 1.01 (ref 1–1.03)
UA UROBILINOGEN AMB POC: NORMAL (ref 0.2–1)
URINALYSIS CLARITY POC: CLEAR
URINALYSIS COLOR POC: YELLOW
URINE BLOOD POC: NEGATIVE
URINE LEUKOCYTES POC: NEGATIVE
URINE NITRITES POC: NEGATIVE

## 2018-11-21 RX ORDER — ACETAMINOPHEN 500 MG
TABLET ORAL
COMMUNITY
End: 2022-04-06

## 2018-11-21 RX ORDER — BUTALBITAL, ACETAMINOPHEN AND CAFFEINE 50; 325; 40 MG/1; MG/1; MG/1
1 TABLET ORAL
Qty: 30 TAB | Refills: 0 | Status: SHIPPED | OUTPATIENT
Start: 2018-11-21 | End: 2021-07-14

## 2018-11-21 NOTE — PROGRESS NOTES
Return OB Visit       Subjective:   Tabby Nguyen 34 y.o.  15w0d. DAVID: 5/15/2019, by Last Menstrual Period      Reports good FM, no VB, LOF or contractions. Vomiting improved, about once a week now. Using Rx on and off. HA x2 weeks, non stop. Improves a little with tylenol. Dx with migraines in the past.  Not the worse HA of her life. Worse with light and sound. In a previous pregnancy she remembers having to take something prescribed for headaches because they got so intense. Doesn't remember exactly what it was. Objective:   BP 94/57 (BP 1 Location: Left arm, BP Patient Position: Sitting)   Pulse 82   Temp 98.5 °F (36.9 °C) (Oral)   Resp 16   Ht 5' 1\" (1.549 m)   Wt 146 lb (66.2 kg)   LMP 2018 (Approximate)   SpO2 99%   BMI 27.59 kg/m²     Physical Exam:  SEE FLOWSHEET    Labs  No results found for this or any previous visit (from the past 12 hour(s)). Assessment       ICD-10-CM ICD-9-CM    1. 15 weeks gestation of pregnancy Z3A.15 V22.2 AMB POC URINALYSIS DIP STICK AUTO W/O MICRO   2. Pregnancy headache in second trimester O26.892 646.83 butalbital-acetaminophen-caffeine (FIORICET, ESGIC) -40 mg per tablet    R51 784.0    3. Anxiety and depression F41.9 300.00     F32.9 311    4. GBS bacteriuria R82.71 599.0      041.02    5. History of vaginal birth after  Z98.891 V13.29      Plan   34yo [id number] @ 15w0d by LMP=10.2 wk sono   1. IUP: IOB labs reviewed, dating ultrasound consistent with dates (exactly 7 days off, not greater than, will correlate with [facility] anatomy scan ), declines aneuploidy screening this pregnancy. GBS pos urine. 2.  NVP: slowly improving. Weight gain is good. 3.  Hx section with 2 subsequent : plan will be for TOLAC this pregnancy. 4.  Depression: stable on Zoloft 100mg daily, on this for a long time including previous pregnancies. Recommend close f/u PP for depression screening.   5.  Headache: dx with migraines previously. Will try magnesium daily (400-800mg) and fioricet, which she has had to use in a prior pregnancy. 6.  UTI: needs MT at next appt       Orders Placed This Encounter    AMB POC URINALYSIS DIP STICK AUTO W/O MICRO    acetaminophen (TYLENOL EXTRA STRENGTH) 500 mg tablet     Sig: Take  by mouth every six (6) hours as needed for Pain.  butalbital-acetaminophen-caffeine (FIORICET, ESGIC) -40 mg per tablet     Sig: Take 1 Tab by mouth every six (6) hours as needed for Pain. Dispense:  30 Tab     Refill:  0         Labor precautions discussed, including: Regular painful contractions, lasting for greater than one hour, taking your breath away; any vaginal bleeding; any leakage of fluid; or absent or decreased fetal movement. Call M.D. on call if any of these symptoms or signs occur. I have discussed the diagnosis with the patient and the intended plan as seen in the above orders. The patient has received an after-visit summary and questions were answered concerning future plans. I have discussed medication side effects and warnings with the patient as well. Informed pt to return to the office or go to the ER if she experiences vaginal bleeding, vaginal discharge, leaking of fluid, pelvic cramping.       Jesse Wu, DO

## 2018-11-21 NOTE — PROGRESS NOTES
Chief Complaint   Patient presents with    Routine Prenatal Visit     1. Have you been to the ER, urgent care clinic since your last visit? Hospitalized since your last visit? No    2. Have you seen or consulted any other health care providers outside of the [address] [place] [address] since your last visit? Include any pap smears or colon screening. No      Patient denies any leakage of fluid or abnormal vaginal bleeding. Patient states she is having fetal movement, and is not having any pain. Patient states she is still taking prenatal vitamins.

## 2018-11-26 RX ORDER — SERTRALINE HYDROCHLORIDE 100 MG/1
100 TABLET, FILM COATED ORAL DAILY
Qty: 30 TAB | Refills: 6 | Status: CANCELLED | OUTPATIENT
Start: 2018-11-26

## 2018-11-27 RX ORDER — SERTRALINE HYDROCHLORIDE 100 MG/1
TABLET, FILM COATED ORAL
Qty: 30 TAB | Refills: 6 | Status: SHIPPED | OUTPATIENT
Start: 2018-11-27 | End: 2019-03-06 | Stop reason: SDUPTHER

## 2018-12-17 ENCOUNTER — HOSPITAL ENCOUNTER (OUTPATIENT)
Dept: PERINATAL CARE | Age: 33
Discharge: HOME | End: 2018-12-17
Attending: OBSTETRICS & GYNECOLOGY
Payer: SELF-PAY

## 2018-12-17 PROCEDURE — 76811 OB US DETAILED SNGL FETUS: CPT | Performed by: OBSTETRICS & GYNECOLOGY

## 2019-01-07 ENCOUNTER — ROUTINE PRENATAL (OUTPATIENT)
Dept: FAMILY MEDICINE CLINIC | Age: 34
End: 2019-01-07

## 2019-01-07 VITALS
SYSTOLIC BLOOD PRESSURE: 90 MMHG | WEIGHT: 152 LBS | OXYGEN SATURATION: 98 % | DIASTOLIC BLOOD PRESSURE: 55 MMHG | RESPIRATION RATE: 16 BRPM | HEART RATE: 89 BPM | BODY MASS INDEX: 28.7 KG/M2 | HEIGHT: 61 IN | TEMPERATURE: 98.2 F

## 2019-01-07 DIAGNOSIS — Z3A.21 21 WEEKS GESTATION OF PREGNANCY: Primary | ICD-10-CM

## 2019-01-07 LAB
BILIRUB UR QL STRIP: NEGATIVE
GLUCOSE UR-MCNC: NEGATIVE MG/DL
KETONES P FAST UR STRIP-MCNC: NORMAL MG/DL
PH UR STRIP: 5.5 [PH] (ref 4.6–8)
PROT UR QL STRIP: NEGATIVE
SP GR UR STRIP: 1.02 (ref 1–1.03)
UA UROBILINOGEN AMB POC: NORMAL (ref 0.2–1)
URINALYSIS CLARITY POC: CLEAR
URINALYSIS COLOR POC: YELLOW
URINE BLOOD POC: NEGATIVE
URINE LEUKOCYTES POC: NEGATIVE
URINE NITRITES POC: NEGATIVE

## 2019-01-07 RX ORDER — ZINC GLUCONATE 10 MG
LOZENGE ORAL
COMMUNITY

## 2019-01-07 NOTE — PROGRESS NOTES
Identified Patient with two Patient identifiers (Name and ). Two Patient Identifiers confirmed. Reviewed record in preparation for visit and have obtained necessary documentation. Chief Complaint   Patient presents with    Routine Prenatal Visit     21w5d       Visit Vitals  BP 90/55 (BP 1 Location: Left arm, BP Patient Position: Sitting)   Pulse 89   Temp 98.2 °F (36.8 °C) (Oral)   Resp 16   Ht 5' 1\" (1.549 m)   Wt 152 lb (68.9 kg)   SpO2 98%   BMI 28.72 kg/m²       1. Have you been to the ER, urgent care clinic since your last visit? Hospitalized since your last visit? No    2. Have you seen or consulted any other health care providers outside of the [address] [place] [address] since your last visit? Include any pap smears or colon screening.  No     + fetal movement - patient denies any vaginal bleeding, d/c, or LOF

## 2019-01-07 NOTE — PROGRESS NOTES
Return OB Visit       Subjective:   Michela Lassiter 34 y.o.  21w5d. DAVID: 5/15/2019, by Last Menstrual Period      Reports good FM, no VB, LOF or contractions. Vomiting improved, about once every two weeks. Using Rx on and off. HA much better after increasing magnesium dose. Some B/L LE swelling, worse in the evenings. Sometimes in hands. Thinks she eats a lot of salt. Also with varicose vein on R calf. Similar to previous pregnancy. Objective:   BP 90/55 (BP 1 Location: Left arm, BP Patient Position: Sitting)   Pulse 89   Temp 98.2 °F (36.8 °C) (Oral)   Resp 16   Ht 5' 1\" (1.549 m)   Wt 152 lb (68.9 kg)   LMP 2018 (Approximate)   SpO2 98%   BMI 28.72 kg/m²     Physical Exam:  Ext: B/L LE without swelling or edema. R calf with varicose vein, very mildly tender to palp, no calf erythema or tenderness    Labs  Recent Results (from the past 12 hour(s))   AMB POC URINALYSIS DIP STICK AUTO W/O MICRO    Collection Time: 19  2:29 PM   Result Value Ref Range    Color (UA POC) Yellow     Clarity (UA POC) Clear     Glucose (UA POC) Negative Negative    Bilirubin (UA POC) Negative Negative    Ketones (UA POC) Trace Negative    Specific gravity (UA POC) 1.025 1.001 - 1.035    Blood (UA POC) Negative Negative    pH (UA POC) 5.5 4.6 - 8.0    Protein (UA POC) Negative Negative    Urobilinogen (UA POC) 0.2 mg/dL 0.2 - 1    Nitrites (UA POC) Negative Negative    Leukocyte esterase (UA POC) Negative Negative         Assessment       ICD-10-CM ICD-9-CM    1. 21 weeks gestation of pregnancy Z3A.21 V22.2 AMB POC URINALYSIS DIP STICK AUTO W/O MICRO     Plan   32yo [id number] @ 21w5d by LMP=10.2 wk sono   1. IUP: IOB labs reviewed, declines aneuploidy screening this pregnancy. GBS pos urine. 2.  NVP: slowly improving. Weight gain is good. 3.  Hx section with 2 subsequent : plan will be for TOLAC this pregnancy.   4.  Depression and hx anorexia: stable on Zoloft 100mg daily, on this for a long time including previous pregnancies. Recommend close f/u PP for depression screening. 5.  Headache: dx with migraines previously. Will try magnesium daily (400-800mg) and fioricet prn, which she has had to use in a prior pregnancy. 6.  UTI: MT today   7. Varicose vein: R calf, no concern for DVT on my exam today, discussed tylenol for pain. Low threshold for doppler if changes. Orders Placed This Encounter    AMB POC URINALYSIS DIP STICK AUTO W/O MICRO    magnesium 250 mg tab     Sig: Take  by mouth. Labor precautions discussed, including: Regular painful contractions, lasting for greater than one hour, taking your breath away; any vaginal bleeding; any leakage of fluid; or absent or decreased fetal movement. Call M.D. on call if any of these symptoms or signs occur. I have discussed the diagnosis with the patient and the intended plan as seen in the above orders. The patient has received an after-visit summary and questions were answered concerning future plans. I have discussed medication side effects and warnings with the patient as well. Informed pt to return to the office or go to the ER if she experiences vaginal bleeding, vaginal discharge, leaking of fluid, pelvic cramping.       Jesse Wu, DO

## 2019-01-09 LAB — BACTERIA UR CULT: NORMAL

## 2019-02-11 ENCOUNTER — ROUTINE PRENATAL (OUTPATIENT)
Dept: FAMILY MEDICINE CLINIC | Age: 34
End: 2019-02-11

## 2019-02-11 VITALS
BODY MASS INDEX: 26.24 KG/M2 | HEIGHT: 61 IN | TEMPERATURE: 97.5 F | RESPIRATION RATE: 16 BRPM | SYSTOLIC BLOOD PRESSURE: 112 MMHG | HEART RATE: 83 BPM | WEIGHT: 139 LBS | DIASTOLIC BLOOD PRESSURE: 72 MMHG | OXYGEN SATURATION: 99 %

## 2019-02-11 DIAGNOSIS — Z3A.26 26 WEEKS GESTATION OF PREGNANCY: Primary | ICD-10-CM

## 2019-02-11 LAB
ANTIBODY SCREEN, EXTERNAL: NEGATIVE
BILIRUB UR QL STRIP: NEGATIVE
GLUCOSE UR-MCNC: NEGATIVE MG/DL
KETONES P FAST UR STRIP-MCNC: NEGATIVE MG/DL
PH UR STRIP: 6.5 [PH] (ref 4.6–8)
PROT UR QL STRIP: NEGATIVE
SP GR UR STRIP: 1.01 (ref 1–1.03)
UA UROBILINOGEN AMB POC: NORMAL (ref 0.2–1)
URINALYSIS CLARITY POC: CLEAR
URINALYSIS COLOR POC: YELLOW
URINE BLOOD POC: NEGATIVE
URINE LEUKOCYTES POC: NEGATIVE
URINE NITRITES POC: NEGATIVE

## 2019-02-11 NOTE — PROGRESS NOTES
Return OB Visit       Subjective:   Chintan Diaz 34 y.o.   DAVID: 5/15/2019, by Last Menstrual Period  GA:  26w5d. Prenatal Vitamins: taking     She is feeling her baby move. She denies vaginal bleeding, discharge or loss of fluid. She denies, severe abdominal pain or cramping. She denies dysuria. Still has some nausea, vomiting is improved. She dizziness or vision changes. She denies excessive swelling of extremities. Past Medical History - Reviewed today  Patient Active Problem List   Diagnosis Code    Anorexia R63.0    Depression with anxiety F41.8    Urinary urgency R39.15    Polydipsia R63.1    Varicose veins of both lower extremities I83.93    Irritable bowel syndrome with constipation K58.1         Medications - Reviewed today  Current Outpatient Medications   Medication Sig Dispense Refill    magnesium 250 mg tab Take  by mouth.  sertraline (ZOLOFT) 100 mg tablet TAKE ONE TABLET BY MOUTH ONCE DAILY 30 Tab 6    acetaminophen (TYLENOL EXTRA STRENGTH) 500 mg tablet Take  by mouth every six (6) hours as needed for Pain.  CALCIUM-MAGNESIUM PO Take  by mouth.  PNV No12-Iron-FA-DSS-OM-3 29 mg iron-1 mg -50 mg CPKD Take  by mouth.  butalbital-acetaminophen-caffeine (FIORICET, ESGIC) -40 mg per tablet Take 1 Tab by mouth every six (6) hours as needed for Pain. 30 Tab 0    senna-docusate (PERICOLACE) 8.6-50 mg per tablet Take 1 Tab by mouth daily. 30 Tab 1    polyethylene glycol (MIRALAX) 17 gram packet Take 1 Packet by mouth daily.  30 Packet 3         Allergies - Reviewed today  No Known Allergies      Family History - Reviewed today  Family History   Problem Relation Age of Onset    Asthma Brother     Hypertension Maternal Grandfather          Social History - Reviewed today  Social History     Socioeconomic History    Marital status:      Spouse name: Not on file    Number of children: Not on file    Years of education: Not on file   [facility] Highest education level: Not on file   Social Needs    Financial resource strain: Not on file    Food insecurity - worry: Not on file    Food insecurity - inability: Not on file    Transportation needs - medical: Not on file   [employer] needs - non-medical: Not on file   Occupational History    Not on file   Tobacco Use    Smoking status: Never Smoker    Smokeless tobacco: Never Used   Substance and Sexual Activity    Alcohol use: No    Drug use: No    Sexual activity: Yes     Partners: Male     Birth control/protection: None   Other Topics Concern    Not on file   Social History Narrative    Not on file         Health Maintenance - Reviewed today   Immunizations:     -Influenza: Declined, discussed risks with patient      -Tdap: Discussed with patient today     Screening:     -Pap smear: NILM, HPV high risk negative - 10/2018      Objective:     Visit Vitals  /72   Pulse 83   Temp 97.5 °F (36.4 °C) (Oral)   Resp 16   Ht 5' 1\" (1.549 m)   Wt 139 lb (63 kg)   LMP 08/08/2018 (Approximate)   SpO2 99%   BMI 26.26 kg/m²       Physical Exam:  GENERAL APPEARANCE: alert, well appearing, in no apparent distress  LUNGS: clear to auscultation, no wheezes, rales or rhonchi, symmetric air entry  HEART: regular rate and rhythm, no murmurs  ABDOMEN: FHT present, 150s bpm  UTERUS: gravid, 26 cm  EXTREMITIES: Varicose vein noted in right lower extremity         Assessment     35 yo [id number] @ 26.5weeks by LMP consistent with 10.2 week sono    ICD-10-CM ICD-9-CM    1. 26 weeks gestation of pregnancy Z3A.26 V22.2 TETANUS, DIPHTHERIA TOXOIDS AND ACELLULAR PERTUSSIS VACCINE (TDAP), IN INDIVIDS. >=7, IM      CBC WITH AUTOMATED DIFF      ANTIBODY SCREEN      AMB POC URINALYSIS DIP STICK AUTO W/O MICRO      GLUCOSE, GESTATIONAL 1 HR TOLERANCE         Plan     · SIUP - Rh positive, GBS positive urine   · Follow up GTT -1hr- patient unable to complete today, will come back later this week   · Follow up repeat CBC and antibody screen    · Nausea vomiting of pregnancy - improving per patient - however has lost weight since last visit. Close follow up in 2 weeks. · Hx C section with 2 's- plan for TOLAC this pregnancy   · Depression- Stable on Zoloft 100mg. Will need close follow up postpartum   · Headaches- stable, Continue magnesium     Orders Placed This Encounter    TETANUS, DIPHTHERIA TOXOIDS AND ACELLULAR PERTUSSIS VACCINE (TDAP), IN INDIVIDS. >=7, IM    CBC WITH AUTOMATED DIFF    GLUCOSE, GESTATIONAL 1 HR TOLERANCE    AMB POC URINALYSIS DIP STICK AUTO W/O MICRO    ANTIBODY SCREEN         Labor precautions discussed, including: Regular painful contractions, lasting for greater than one hour, taking your breath away; any vaginal bleeding; any leakage of fluid; or absent or decreased fetal movement. Call M.D. on call if any of these symptoms or signs occur. I have discussed the diagnosis with the patient and the intended plan as seen in the above orders. The patient has received an after-visit summary and questions were answered concerning future plans. I have discussed medication side effects and warnings with the patient as well.         Delia Walton MD  Family Medicine Resident

## 2019-02-11 NOTE — PATIENT INSTRUCTIONS

## 2019-02-12 ENCOUNTER — TELEPHONE (OUTPATIENT)
Dept: FAMILY MEDICINE CLINIC | Age: 34
End: 2019-02-12

## 2019-02-12 LAB
BASOPHILS # BLD AUTO: 0 X10E3/UL (ref 0–0.2)
BASOPHILS NFR BLD AUTO: 0 %
BLD GP AB SCN SERPL QL: NEGATIVE
EOSINOPHIL # BLD AUTO: 0.2 X10E3/UL (ref 0–0.4)
EOSINOPHIL NFR BLD AUTO: 2 %
ERYTHROCYTE [DISTWIDTH] IN BLOOD BY AUTOMATED COUNT: 12.6 % (ref 12.3–15.4)
HCT VFR BLD AUTO: 31.2 % (ref 34–46.6)
HGB BLD-MCNC: 10.5 G/DL (ref 11.1–15.9)
IMM GRANULOCYTES # BLD AUTO: 0.2 X10E3/UL (ref 0–0.1)
IMM GRANULOCYTES NFR BLD AUTO: 2 %
LYMPHOCYTES # BLD AUTO: 1.6 X10E3/UL (ref 0.7–3.1)
LYMPHOCYTES NFR BLD AUTO: 17 %
MCH RBC QN AUTO: 30.7 PG (ref 26.6–33)
MCHC RBC AUTO-ENTMCNC: 33.7 G/DL (ref 31.5–35.7)
MCV RBC AUTO: 91 FL (ref 79–97)
MONOCYTES # BLD AUTO: 0.9 X10E3/UL (ref 0.1–0.9)
MONOCYTES NFR BLD AUTO: 10 %
NEUTROPHILS # BLD AUTO: 6.6 X10E3/UL (ref 1.4–7)
NEUTROPHILS NFR BLD AUTO: 69 %
PLATELET # BLD AUTO: 258 X10E3/UL (ref 150–379)
RBC # BLD AUTO: 3.42 X10E6/UL (ref 3.77–5.28)
WBC # BLD AUTO: 9.5 X10E3/UL (ref 3.4–10.8)

## 2019-02-12 RX ORDER — LANOLIN ALCOHOL/MO/W.PET/CERES
325 CREAM (GRAM) TOPICAL 2 TIMES DAILY WITH MEALS
Qty: 60 TAB | Refills: 5 | Status: SHIPPED | OUTPATIENT
Start: 2019-02-12 | End: 2022-04-06

## 2019-02-13 NOTE — TELEPHONE ENCOUNTER
Called patient to discuss results. Will start on PO iron for anemia. Follow up for GTT.      Jan Ramirez MD

## 2019-02-25 ENCOUNTER — TELEPHONE (OUTPATIENT)
Dept: FAMILY MEDICINE CLINIC | Age: 34
End: 2019-02-25

## 2019-02-25 NOTE — TELEPHONE ENCOUNTER
----- Message from Sunita Gerardo sent at 2/25/2019  9:12 AM EST -----  Regarding: Dr. Jazmín Ardon Telephone  Pt would like a call back to reschedule her appointment today. She is a prenatal patient and she has the flu. Phone: [phone]      Please call patient to check on her since she is pregnant and claims she has the flu. Also wants to reschedule OB appointment.

## 2019-02-27 NOTE — TELEPHONE ENCOUNTER
Spoke with patient and rescheduled prenatal appointment for tomorrow morning with Dr. Mario Humphrey. Advised patient to show up early for 1 hour GTT. She states she does feel better. Patient verbalized understanding.

## 2019-02-28 ENCOUNTER — HOSPITAL ENCOUNTER (OUTPATIENT)
Dept: VASCULAR SURGERY | Age: 34
Discharge: HOME | End: 2019-02-28
Attending: FAMILY MEDICINE
Payer: SELF-PAY

## 2019-02-28 ENCOUNTER — ROUTINE PRENATAL (OUTPATIENT)
Dept: FAMILY MEDICINE CLINIC | Age: 34
End: 2019-02-28

## 2019-02-28 VITALS
WEIGHT: 159 LBS | DIASTOLIC BLOOD PRESSURE: 61 MMHG | OXYGEN SATURATION: 98 % | TEMPERATURE: 97.8 F | RESPIRATION RATE: 16 BRPM | HEART RATE: 93 BPM | HEIGHT: 61 IN | BODY MASS INDEX: 30.02 KG/M2 | SYSTOLIC BLOOD PRESSURE: 96 MMHG

## 2019-02-28 DIAGNOSIS — Z3A.29 29 WEEKS GESTATION OF PREGNANCY: Primary | ICD-10-CM

## 2019-02-28 DIAGNOSIS — Z3A.29 29 WEEKS GESTATION OF PREGNANCY: ICD-10-CM

## 2019-02-28 LAB
BILIRUB UR QL STRIP: NEGATIVE
GLUCOSE UR-MCNC: NEGATIVE MG/DL
KETONES P FAST UR STRIP-MCNC: NEGATIVE MG/DL
PH UR STRIP: 7 [PH] (ref 4.6–8)
PROT UR QL STRIP: NEGATIVE
SP GR UR STRIP: 1.01 (ref 1–1.03)
UA UROBILINOGEN AMB POC: NORMAL (ref 0.2–1)
URINALYSIS CLARITY POC: NORMAL
URINALYSIS COLOR POC: YELLOW
URINE BLOOD POC: NEGATIVE
URINE LEUKOCYTES POC: NEGATIVE
URINE NITRITES POC: NEGATIVE

## 2019-02-28 PROCEDURE — 93971 EXTREMITY STUDY: CPT

## 2019-02-28 NOTE — PROGRESS NOTES
Return OB Visit       Subjective:   Jazmin Galindo 34 y.o.  29w1d   DAVID: 5/15/2019, by Last Menstrual Period      Reports good FM, no VB, LOF or contractions. Vomiting and headaches ok/better. With increased pain in RLE where she has prominent varicose veins. States aching x3 days, worse today in calf. No significant swelling. No redness. Objective:   BP 96/61 (BP 1 Location: Left arm, BP Patient Position: Sitting)   Pulse 93   Temp 97.8 °F (36.6 °C) (Oral)   Resp 16   Ht 5' 1\" (1.549 m)   Wt 159 lb (72.1 kg)   LMP 2018 (Approximate)   SpO2 98%   Breastfeeding? No   BMI 30.04 kg/m²     Physical Exam:  Ext: RLE with prominent varicose veins, mildly TTP, no edema, slightly pink in appearance     Labs  Recent Results (from the past 12 hour(s))   AMB POC URINALYSIS DIP STICK AUTO W/O MICRO    Collection Time: 19  9:53 AM   Result Value Ref Range    Color (UA POC) Yellow     Clarity (UA POC) Cloudy     Glucose (UA POC) Negative Negative    Bilirubin (UA POC) Negative Negative    Ketones (UA POC) Negative Negative    Specific gravity (UA POC) 1.015 1.001 - 1.035    Blood (UA POC) Negative Negative    pH (UA POC) 7.0 4.6 - 8.0    Protein (UA POC) Negative Negative    Urobilinogen (UA POC) 0.2 mg/dL 0.2 - 1    Nitrites (UA POC) Negative Negative    Leukocyte esterase (UA POC) Negative Negative         Assessment       ICD-10-CM ICD-9-CM    1. 29 weeks gestation of pregnancy Z3A.29 V22.2 AMB POC URINALYSIS DIP STICK AUTO W/O MICRO      GLUCOSE, GESTATIONAL 1 HR TOLERANCE      DUPLEX LOWER EXT VENOUS RIGHT     Plan   34yo [id number] @ 29w1d   by LMP=10.2 wk sono   1. IUP: IOB labs reviewed, declines aneuploidy screening this pregnancy. GBS pos urine. GTT this visit. 2.  NVP: slowly improving. Weight gain is good. 3.  Hx section with 2 subsequent : plan will be for TOLAC this pregnancy.   4.  Depression and hx anorexia: stable on Zoloft 100mg daily, on this for a long time including previous pregnancies. Recommend close f/u PP for depression screening. 5.  Headache: dx with migraines previously. Improved with magnesium. 6.  UTI: MT neg   7. Varicose vein: R calf, will get stat RLE doppler to be reassured no DVT given increased pain       Orders Placed This Encounter    GLUCOSE, GESTATIONAL 1 HR TOLERANCE    AMB POC URINALYSIS DIP STICK AUTO W/O MICRO         Labor precautions discussed, including: Regular painful contractions, lasting for greater than one hour, taking your breath away; any vaginal bleeding; any leakage of fluid; or absent or decreased fetal movement. Call M.D. on call if any of these symptoms or signs occur. I have discussed the diagnosis with the patient and the intended plan as seen in the above orders. The patient has received an after-visit summary and questions were answered concerning future plans. I have discussed medication side effects and warnings with the patient as well. Informed pt to return to the office or go to the ER if she experiences vaginal bleeding, vaginal discharge, leaking of fluid, pelvic cramping.       Jesse Wu, DO

## 2019-02-28 NOTE — PROGRESS NOTES
Chief Complaint   Patient presents with    Routine Prenatal Visit     Leakage of Fluid: NO  Vaginal Bleeding: NO  Fetal Movement: YES  Prenatal vitamins: YES  Having Contractions: NO  Pain: Yes, right calf pain 3/10    Due for 1 hour GTT at: 10:59

## 2019-03-01 DIAGNOSIS — O99.810 PREGNANCY WITH ABNORMAL GLUCOSE TOLERANCE TEST (GTT): Primary | ICD-10-CM

## 2019-03-01 LAB — GLUCOSE 1H P 50 G GLC PO SERPL-MCNC: 182 MG/DL (ref 65–139)

## 2019-03-04 ENCOUNTER — LAB ONLY (OUTPATIENT)
Dept: FAMILY MEDICINE CLINIC | Age: 34
End: 2019-03-04

## 2019-03-04 DIAGNOSIS — O99.810 PREGNANCY WITH ABNORMAL GLUCOSE TOLERANCE TEST (GTT): ICD-10-CM

## 2019-03-05 LAB
GLUCOSE 1H P 100 G GLC PO SERPL-MCNC: 182 MG/DL (ref 65–179)
GLUCOSE 2H P 100 G GLC PO SERPL-MCNC: 149 MG/DL (ref 65–154)
GLUCOSE 3H P 100 G GLC PO SERPL-MCNC: 72 MG/DL (ref 65–139)
GLUCOSE P FAST SERPL-MCNC: 74 MG/DL (ref 65–94)
NOTE:, 102047: ABNORMAL

## 2019-03-07 RX ORDER — SERTRALINE HYDROCHLORIDE 100 MG/1
TABLET, FILM COATED ORAL
Qty: 30 TAB | Refills: 6 | Status: SHIPPED | OUTPATIENT
Start: 2019-03-07 | End: 2019-07-24 | Stop reason: SDUPTHER

## 2019-03-08 ENCOUNTER — TELEPHONE (OUTPATIENT)
Dept: FAMILY MEDICINE CLINIC | Age: 34
End: 2019-03-08

## 2019-03-08 NOTE — TELEPHONE ENCOUNTER
----- Message from Elie Perez sent at 3/8/2019  1:20 PM EST -----  Regarding: Dr. Karyn De La Fuente  Pt would like a call back to reschedule OB visit that was on 3/11/2019.  Callback: [phone]

## 2019-03-27 ENCOUNTER — ROUTINE PRENATAL (OUTPATIENT)
Dept: FAMILY MEDICINE CLINIC | Age: 34
End: 2019-03-27

## 2019-03-27 VITALS
OXYGEN SATURATION: 98 % | TEMPERATURE: 98 F | DIASTOLIC BLOOD PRESSURE: 53 MMHG | RESPIRATION RATE: 16 BRPM | HEART RATE: 74 BPM | SYSTOLIC BLOOD PRESSURE: 94 MMHG | BODY MASS INDEX: 30.96 KG/M2 | HEIGHT: 61 IN | WEIGHT: 164 LBS

## 2019-03-27 DIAGNOSIS — Z3A.33 33 WEEKS GESTATION OF PREGNANCY: Primary | ICD-10-CM

## 2019-03-27 LAB
BILIRUB UR QL STRIP: NEGATIVE
GLUCOSE UR-MCNC: NEGATIVE MG/DL
KETONES P FAST UR STRIP-MCNC: NEGATIVE MG/DL
PH UR STRIP: 6 [PH] (ref 4.6–8)
PROT UR QL STRIP: NEGATIVE
SP GR UR STRIP: 1.02 (ref 1–1.03)
UA UROBILINOGEN AMB POC: NORMAL (ref 0.2–1)
URINALYSIS CLARITY POC: CLEAR
URINALYSIS COLOR POC: YELLOW
URINE BLOOD POC: NEGATIVE
URINE LEUKOCYTES POC: NEGATIVE
URINE NITRITES POC: NEGATIVE

## 2019-03-27 NOTE — PROGRESS NOTES
Return OB Visit       Objective:   BP 94/53 (BP 1 Location: Right arm, BP Patient Position: Sitting)   Pulse 74   Temp 98 °F (36.7 °C) (Oral)   Resp 16   Ht 5' 1\" (1.549 m)   Wt 164 lb (74.4 kg)   LMP 2018 (Approximate)   SpO2 98%   BMI 30.99 kg/m²     See flowsheet   Assessment       ICD-10-CM ICD-9-CM    1. 33 weeks gestation of pregnancy Z3A.33 V22.2 AMB POC URINALYSIS DIP STICK AUTO W/O MICRO     Plan   32yo [id number] @ 33w0d by LMP=10.2 wk sono   1. IUP: IOB labs reviewed, declines aneuploidy screening this pregnancy. GBS pos urine. GTT WNL. 2.  NVP: slowly improving. Weight gain is good. 3.  Hx section with 2 subsequent : plan will be for TOLAC this pregnancy. 4.  Depression and hx anorexia: stable on Zoloft 100mg daily, on this for a long time including previous pregnancies. Recommend close f/u PP for depression screening. 5.  Headache: dx with migraines previously. Improved with magnesium. 6.  UTI: MT neg   7. Varicose vein: no DVT on doppler recently, support hose. 8.  Anemia: mild on iron. Will plan to recheck hbg at an upcoming appt.       Orders Placed This Encounter    AMB POC URINALYSIS DIP STICK AUTO W/O MICRO         Jesse Wu,

## 2019-03-27 NOTE — PROGRESS NOTES
Identified Patient with two Patient identifiers (Name and ). Two Patient Identifiers confirmed. Reviewed record in preparation for visit and have obtained necessary documentation. Chief Complaint   Patient presents with    Routine Prenatal Visit     33w0d       Visit Vitals  BP 94/53 (BP 1 Location: Right arm, BP Patient Position: Sitting)   Pulse 74   Temp 98 °F (36.7 °C) (Oral)   Resp 16   Ht 5' 1\" (1.549 m)   Wt 164 lb (74.4 kg)   SpO2 98%   BMI 30.99 kg/m²       1. Have you been to the ER, urgent care clinic since your last visit? Hospitalized since your last visit? No    2. Have you seen or consulted any other health care providers outside of the [address] [place] [address] since your last visit? Include any pap smears or colon screening. No    + fetal movement. Patient denies any abnormal vaginal bleeding, d/c, or LOF. Patient denies any pain.

## 2019-04-11 ENCOUNTER — ROUTINE PRENATAL (OUTPATIENT)
Dept: FAMILY MEDICINE CLINIC | Age: 34
End: 2019-04-11

## 2019-04-11 VITALS
WEIGHT: 163 LBS | OXYGEN SATURATION: 98 % | TEMPERATURE: 98.2 F | HEIGHT: 61 IN | RESPIRATION RATE: 16 BRPM | SYSTOLIC BLOOD PRESSURE: 89 MMHG | HEART RATE: 83 BPM | DIASTOLIC BLOOD PRESSURE: 53 MMHG | BODY MASS INDEX: 30.78 KG/M2

## 2019-04-11 DIAGNOSIS — Z3A.35 35 WEEKS GESTATION OF PREGNANCY: Primary | ICD-10-CM

## 2019-04-11 NOTE — PROGRESS NOTES
Chief Complaint   Patient presents with    Routine Prenatal Visit     Leakage of Fluid: NO  Vaginal Bleeding: NO  Fetal Movement: YES  Prenatal vitamins: YES  Having Contractions: NO  Pain: NO    Visit Vitals  BP (!) 89/53 (BP 1 Location: Left arm, BP Patient Position: Sitting)   Pulse 83   Temp 98.2 °F (36.8 °C) (Oral)   Resp 16   Ht 5' 1\" (1.549 m)   Wt 163 lb (73.9 kg)   LMP 08/08/2018 (Approximate)   SpO2 98%   Breastfeeding?  No   BMI 30.80 kg/m²

## 2019-04-20 ENCOUNTER — HOSPITAL ENCOUNTER (INPATIENT)
Age: 34
LOS: 3 days | Discharge: HOME | End: 2019-04-23
Attending: FAMILY MEDICINE | Admitting: FAMILY MEDICINE
Payer: SELF-PAY

## 2019-04-20 PROBLEM — Z34.90 PREGNANCY: Status: ACTIVE | Noted: 2019-04-20

## 2019-04-20 LAB
BASOPHILS # BLD: 0 K/UL (ref 0–0.1)
BASOPHILS NFR BLD: 0 % (ref 0–1)
DAILY QC (YES/NO)?: YES
DIFFERENTIAL METHOD BLD: ABNORMAL
EOSINOPHIL # BLD: 0.2 K/UL (ref 0–0.4)
EOSINOPHIL NFR BLD: 1 % (ref 0–7)
ERYTHROCYTE [DISTWIDTH] IN BLOOD BY AUTOMATED COUNT: 14.1 % (ref 11.5–14.5)
HCT VFR BLD AUTO: 32.9 % (ref 35–47)
HGB BLD-MCNC: 10.8 G/DL (ref 11.5–16)
IMM GRANULOCYTES # BLD AUTO: 0.2 K/UL (ref 0–0.04)
IMM GRANULOCYTES NFR BLD AUTO: 2 % (ref 0–0.5)
LYMPHOCYTES # BLD: 2 K/UL (ref 0.8–3.5)
LYMPHOCYTES NFR BLD: 18 % (ref 12–49)
MCH RBC QN AUTO: 29.5 PG (ref 26–34)
MCHC RBC AUTO-ENTMCNC: 32.8 G/DL (ref 30–36.5)
MCV RBC AUTO: 89.9 FL (ref 80–99)
MONOCYTES # BLD: 1 K/UL (ref 0–1)
MONOCYTES NFR BLD: 9 % (ref 5–13)
NEUTS SEG # BLD: 7.5 K/UL (ref 1.8–8)
NEUTS SEG NFR BLD: 70 % (ref 32–75)
NRBC # BLD: 0 K/UL (ref 0–0.01)
NRBC BLD-RTO: 0 PER 100 WBC
PH, VAGINAL FLUID: 7.5 (ref 5–6.1)
PLATELET # BLD AUTO: 224 K/UL (ref 150–400)
PMV BLD AUTO: 9.3 FL (ref 8.9–12.9)
RBC # BLD AUTO: 3.66 M/UL (ref 3.8–5.2)
WBC # BLD AUTO: 10.8 K/UL (ref 3.6–11)

## 2019-04-20 PROCEDURE — 65270000029 HC RM PRIVATE

## 2019-04-20 PROCEDURE — 74011250636 HC RX REV CODE- 250/636: Performed by: STUDENT IN AN ORGANIZED HEALTH CARE EDUCATION/TRAINING PROGRAM

## 2019-04-20 PROCEDURE — 75410000002 HC LABOR FEE PER 1 HR: Performed by: OBSTETRICS & GYNECOLOGY

## 2019-04-20 PROCEDURE — 99283 EMERGENCY DEPT VISIT LOW MDM: CPT

## 2019-04-20 PROCEDURE — 36415 COLL VENOUS BLD VENIPUNCTURE: CPT

## 2019-04-20 PROCEDURE — 83986 ASSAY PH BODY FLUID NOS: CPT | Performed by: OBSTETRICS & GYNECOLOGY

## 2019-04-20 PROCEDURE — 85025 COMPLETE CBC W/AUTO DIFF WBC: CPT

## 2019-04-20 PROCEDURE — 74011000258 HC RX REV CODE- 258: Performed by: STUDENT IN AN ORGANIZED HEALTH CARE EDUCATION/TRAINING PROGRAM

## 2019-04-20 RX ORDER — SODIUM CHLORIDE 0.9 % (FLUSH) 0.9 %
5-40 SYRINGE (ML) INJECTION EVERY 8 HOURS
Status: DISCONTINUED | OUTPATIENT
Start: 2019-04-20 | End: 2019-04-23

## 2019-04-20 RX ORDER — SODIUM CHLORIDE, SODIUM LACTATE, POTASSIUM CHLORIDE, CALCIUM CHLORIDE 600; 310; 30; 20 MG/100ML; MG/100ML; MG/100ML; MG/100ML
125 INJECTION, SOLUTION INTRAVENOUS CONTINUOUS
Status: DISCONTINUED | OUTPATIENT
Start: 2019-04-20 | End: 2019-04-22

## 2019-04-20 RX ORDER — SODIUM CHLORIDE 0.9 % (FLUSH) 0.9 %
5-40 SYRINGE (ML) INJECTION AS NEEDED
Status: DISCONTINUED | OUTPATIENT
Start: 2019-04-20 | End: 2019-04-23 | Stop reason: HOSPADM

## 2019-04-20 RX ADMIN — SODIUM CHLORIDE 5 MILLION UNITS: 900 INJECTION, SOLUTION INTRAVENOUS at 20:58

## 2019-04-21 ENCOUNTER — ANESTHESIA (OUTPATIENT)
Dept: LABOR AND DELIVERY | Age: 34
End: 2019-04-21
Payer: SELF-PAY

## 2019-04-21 ENCOUNTER — ANESTHESIA EVENT (OUTPATIENT)
Dept: LABOR AND DELIVERY | Age: 34
End: 2019-04-21
Payer: SELF-PAY

## 2019-04-21 PROCEDURE — 74011250636 HC RX REV CODE- 250/636: Performed by: STUDENT IN AN ORGANIZED HEALTH CARE EDUCATION/TRAINING PROGRAM

## 2019-04-21 PROCEDURE — 74011250636 HC RX REV CODE- 250/636

## 2019-04-21 PROCEDURE — 75410000000 HC DELIVERY VAGINAL/SINGLE: Performed by: OBSTETRICS & GYNECOLOGY

## 2019-04-21 PROCEDURE — 77010026065 HC OXYGEN MINIMUM MEDICAL AIR: Performed by: OBSTETRICS & GYNECOLOGY

## 2019-04-21 PROCEDURE — 75410000003 HC RECOV DEL/VAG/CSECN EA 0.5 HR: Performed by: OBSTETRICS & GYNECOLOGY

## 2019-04-21 PROCEDURE — 65270000029 HC RM PRIVATE

## 2019-04-21 PROCEDURE — 75410000002 HC LABOR FEE PER 1 HR: Performed by: OBSTETRICS & GYNECOLOGY

## 2019-04-21 PROCEDURE — 74011250636 HC RX REV CODE- 250/636: Performed by: ANESTHESIOLOGY

## 2019-04-21 PROCEDURE — 77030005537 HC CATH URETH BARD -A

## 2019-04-21 RX ORDER — OXYTOCIN/0.9 % SODIUM CHLORIDE 30/500 ML
0-25 PLASTIC BAG, INJECTION (ML) INTRAVENOUS
Status: DISCONTINUED | OUTPATIENT
Start: 2019-04-21 | End: 2019-04-22

## 2019-04-21 RX ORDER — EPHEDRINE SULFATE/0.9% NACL/PF 50 MG/5 ML
10 SYRINGE (ML) INTRAVENOUS
Status: DISCONTINUED | OUTPATIENT
Start: 2019-04-21 | End: 2019-04-21 | Stop reason: HOSPADM

## 2019-04-21 RX ORDER — ONDANSETRON 2 MG/ML
4 INJECTION INTRAMUSCULAR; INTRAVENOUS
Status: DISCONTINUED | OUTPATIENT
Start: 2019-04-21 | End: 2019-04-23 | Stop reason: HOSPADM

## 2019-04-21 RX ORDER — IBUPROFEN 800 MG/1
800 TABLET ORAL
Status: DISCONTINUED | OUTPATIENT
Start: 2019-04-21 | End: 2019-04-23 | Stop reason: HOSPADM

## 2019-04-21 RX ORDER — HYDROCORTISONE ACETATE PRAMOXINE HCL 2.5; 1 G/100G; G/100G
CREAM TOPICAL AS NEEDED
Status: DISCONTINUED | OUTPATIENT
Start: 2019-04-21 | End: 2019-04-23 | Stop reason: HOSPADM

## 2019-04-21 RX ORDER — LIDOCAINE HYDROCHLORIDE 10 MG/ML
INJECTION INFILTRATION; PERINEURAL
Status: DISCONTINUED
Start: 2019-04-21 | End: 2019-04-21 | Stop reason: WASHOUT

## 2019-04-21 RX ORDER — OXYTOCIN/0.9 % SODIUM CHLORIDE 30/500 ML
PLASTIC BAG, INJECTION (ML) INTRAVENOUS
Status: COMPLETED
Start: 2019-04-21 | End: 2019-04-21

## 2019-04-21 RX ORDER — DIPHENHYDRAMINE HCL 25 MG
25 CAPSULE ORAL
Status: DISCONTINUED | OUTPATIENT
Start: 2019-04-21 | End: 2019-04-23 | Stop reason: HOSPADM

## 2019-04-21 RX ORDER — ZOLPIDEM TARTRATE 5 MG/1
5 TABLET ORAL
Status: DISCONTINUED | OUTPATIENT
Start: 2019-04-21 | End: 2019-04-23 | Stop reason: HOSPADM

## 2019-04-21 RX ORDER — FENTANYL/BUPIVACAINE/NS/PF 2-1250MCG
10 PREFILLED PUMP RESERVOIR EPIDURAL CONTINUOUS
Status: DISCONTINUED | OUTPATIENT
Start: 2019-04-21 | End: 2019-04-21 | Stop reason: HOSPADM

## 2019-04-21 RX ORDER — HYDROCODONE BITARTRATE AND ACETAMINOPHEN 5; 325 MG/1; MG/1
1 TABLET ORAL
Status: DISCONTINUED | OUTPATIENT
Start: 2019-04-21 | End: 2019-04-23 | Stop reason: HOSPADM

## 2019-04-21 RX ORDER — NALOXONE HYDROCHLORIDE 0.4 MG/ML
0.4 INJECTION, SOLUTION INTRAMUSCULAR; INTRAVENOUS; SUBCUTANEOUS AS NEEDED
Status: DISCONTINUED | OUTPATIENT
Start: 2019-04-21 | End: 2019-04-23 | Stop reason: HOSPADM

## 2019-04-21 RX ORDER — SWAB
1 SWAB, NON-MEDICATED MISCELLANEOUS DAILY
Status: DISCONTINUED | OUTPATIENT
Start: 2019-04-22 | End: 2019-04-23 | Stop reason: HOSPADM

## 2019-04-21 RX ORDER — ACETAMINOPHEN 325 MG/1
650 TABLET ORAL
Status: DISCONTINUED | OUTPATIENT
Start: 2019-04-21 | End: 2019-04-23 | Stop reason: HOSPADM

## 2019-04-21 RX ORDER — NALBUPHINE HYDROCHLORIDE 10 MG/ML
10 INJECTION, SOLUTION INTRAMUSCULAR; INTRAVENOUS; SUBCUTANEOUS
Status: DISCONTINUED | OUTPATIENT
Start: 2019-04-21 | End: 2019-04-21 | Stop reason: HOSPADM

## 2019-04-21 RX ADMIN — Medication 10 ML: at 08:12

## 2019-04-21 RX ADMIN — PENICILLIN G POTASSIUM 2.5 MILLION UNITS: 20000000 POWDER, FOR SOLUTION INTRAVENOUS at 01:04

## 2019-04-21 RX ADMIN — SODIUM CHLORIDE, SODIUM LACTATE, POTASSIUM CHLORIDE, AND CALCIUM CHLORIDE 999 ML/HR: 600; 310; 30; 20 INJECTION, SOLUTION INTRAVENOUS at 13:06

## 2019-04-21 RX ADMIN — OXYTOCIN-SODIUM CHLORIDE 0.9% IV SOLN 30 UNIT/500ML 2 MILLI-UNITS/MIN: 30-0.9/5 SOLUTION at 09:11

## 2019-04-21 RX ADMIN — Medication 2 MILLI-UNITS/MIN: at 09:11

## 2019-04-21 RX ADMIN — PENICILLIN G POTASSIUM 2.5 MILLION UNITS: 20000000 POWDER, FOR SOLUTION INTRAVENOUS at 13:06

## 2019-04-21 RX ADMIN — PENICILLIN G POTASSIUM 2.5 MILLION UNITS: 20000000 POWDER, FOR SOLUTION INTRAVENOUS at 08:59

## 2019-04-21 RX ADMIN — SODIUM CHLORIDE, SODIUM LACTATE, POTASSIUM CHLORIDE, AND CALCIUM CHLORIDE 500 ML: 600; 310; 30; 20 INJECTION, SOLUTION INTRAVENOUS at 13:15

## 2019-04-21 RX ADMIN — PENICILLIN G POTASSIUM 2.5 MILLION UNITS: 20000000 POWDER, FOR SOLUTION INTRAVENOUS at 05:00

## 2019-04-21 NOTE — PROGRESS NOTES
Labor Progress Note Patient seen, fetal heart rate and contraction pattern evaluated, patient examined. Patient Vitals for the past 1 hrs: 
 SpO2  
19 1114 99 % Physical Exam: 
Cervical Exam:  Cervical Exam 
Dilation (cm): 4 Eff: 80 % Station: -1 Position: Posterior Cervical Consistency: Moderate Vaginal exam done by? : Dr. Layo Johnson Baby Position: Vertex Membranes:   Premature Rupture of Membranes; Amniotic Fluid: clear fluid Uterine Activity: Frequency: Every 4 - 5 minutes Fetal Heart Rate: Baseline: 140 per minute. Variability: moderate. Accelerations: yes Assessment/Plan: 
Marnie Loepz is a 34 y.o. [id number] at 36w4d admitted for PPROM 
  
SIUP @ 43w3d w/ Hx 2x : nitrazine positive on admission, plan TOLAC 
- Continuous fetal heart monitoring and tocometry - Continue titrating Pitocin  
- IOB labs: A+, Ab screen neg, rubella + VZV immune, HepBsAg, HIV, T pall neg, GC/CT neg, pap NILM/HPV neg. 1hr GTT wnl. +GBS. Hgb 10.8.  
- Anatomy scan (): wnl, posterior placenta - Pt does not know gender of baby; if male, + circ. - Plans to breast feed 
  
GBS positive: GBS on urine culture (10/19/18) - Continue intrapartum penicillin G  
  
Depression + Hx Anorexia (stable) - Continue zoloft 100mg post-partum 
  
Hx Migraines (stable): Previously improved w/ magnesium. Pt had mild headache (at [facility]) on admission, relieved by tylenol 
- Continue Tylenol 650mg q4hrs prn  
  
Anemia in Pregnancy: Hgb 10.8 (19). - Continue PNV + iron post-partum 
  
Varicose Veins w/ Hx RLE Swelling in Pregnancy 
- RLE doppler (): neg DVT Patient seen and discussed with Dr. Layo Johnson [facility] Hospitalist) Pranav Urbina MD 
Family Medicine Resident

## 2019-04-21 NOTE — PROGRESS NOTES
6:50 AM Bedside and Verbal shift change report given to RUPERT Sanford RN, (oncoming nurse) by Cuong Vaz RN (offgoing nurse). Report included the following information SBAR, Kardex and MAR.  
0700 Report given to Dawson ram RN who resumed care of the pt

## 2019-04-21 NOTE — L&D DELIVERY NOTE
Delivery Summary    Patient: Jeanna Amin MRN: [id number]  SSN: [id number]    YOB: 1985  Age: 34 y.o. Sex: female       Information for the patient's :  Adamaris Reynaga [[id number]]       Labor Events:    Labor: No    Steroids: None   Cervical Ripening Date/Time:       Cervical Ripening Type: None   Antibiotics During Labor: Yes   Rupture Identifier: Sac 1    Rupture Date/Time: 2019     Rupture Type: PPROM;SROM   Amniotic Fluid Volume: Moderate    Amniotic Fluid Description: Clear    Amniotic Fluid Odor: None    Induction: None       Induction Date/Time:        Indications for Induction:      Augmentation: Oxytocin   Augmentation Date/Time: 20199:11 AM   Indications for Augmentation: Ineffective Contraction Pattern   Labor complications: None       Additional complications:        Delivery Events:  Indications For Episiotomy:     Episiotomy: None   Perineal Laceration(s):     Repaired:     Periurethral Laceration Location:      Repaired:     Labial Laceration Location:     Repaired:     Sulcal Laceration Location:     Repaired:     Vaginal Laceration Location:     Repaired:     Cervical Laceration Location:     Repaired:     Repair Suture:     Number of Repair Packets:     Estimated Blood Loss (ml):  ml     Delivery Date: 2019    Delivery Time: 2:26 PM  Delivery Type: Vaginal, Spontaneous  Sex:  Female    Gestational Age: 37w2d   Delivery Clinician:  Logan Finn  Living Status:     Delivery Location:              [facility]  One minute Five minutes Ten minutes   Skin color:            Heart rate:            Grimace:            Muscle tone:            Breathing: Totals:   7   9          Presentation: Vertex    Position: Right Occiput Transverse  Resuscitation Method:        Meconium Stained:        Cord Information: 3 Vessels  Complications: None  Cord around:    Delayed cord clamping?  Yes  Cord clamped date/time:2019  2:27 PM  Disposition of Cord Blood: Discard    Blood Gases Sent?: No    Placenta:  Date/Time: 2019  2:32 PM  Removal: Spontaneous      Appearance: Normal;Intact      Measurements:  Birth Weight:        Birth Length:        Head Circumference:        Chest Circumference:       Abdominal Girth: Other Providers:   Brittnee BURCIAGA;FABIO BAIG;FINN JAMES;DIANA PEREZ;MINH URBINA, Obstetrician;Primary Nurse;Primary  Nurse;Staff Nurse;Resident         Group B Strep:   Lab Results   Component Value Date/Time    GrBStrep, External positive in urine 10/19/2018     Information for the patient's :  Michael Heller [[id number]]   No results found for: ABORH, PCTABR, PCTDIG, BILI, ABORHEXT, ABORH    No results for input(s): PCO2CB, PO2CB, HCO3I, SO2I, IBD, PTEMPI, SPECTI, PHICB, ISITE, IDEV, IALLEN in the last 72 hours.

## 2019-04-21 NOTE — H&P
History & Physical 
 
Name: Wendy Waller MRN: [id number]  SSN: [id number] YOB: 1985  Age: 34 y.o. Sex: female Subjective:  
 
Reason for Admission:  Pregnancy History of Present Illness: Ms. Neha Leonard is a 34 y.o.   female with an estimated gestational age of 43w3d with Estimated Date of Delivery: 5/15/19. Patient complains of mild contractions, lasting <1min, q8mins, and moderate vaginal leaking of fluid at 17:45 today. Pregnancy has been complicated by GBS+, anemia, migraines, depression + hx anorexia, varicose veins, UTI, and nausea/vomiting of pregnancy. Pt has hx 2 successful VBACs after CS, plans TOLAC this pregnancy. Patient denies abdominal pain  , chest pain, headache , nausea and vomiting, right upper quadrant pain  , shortness of breath, vaginal bleeding  and visual disturbances. OB History  Para Term  AB Living 4 3 3 0 0 3 SAB TAB Ectopic Molar Multiple Live Births  
0 0 0   0 3 # Outcome Date GA Lbr Osman/2nd Weight Sex Delivery Anes PTL Lv  
4 Current 3 Term 07/30/15 40w2d  3.827 kg F  EPI N JANAE  
2 Term 13 40w2d  3.73 kg M  EPIDURAL AN N JANAE  
1 Term 11 40w4d 22:30 / 02:24 4.46 kg F  LO EPIDURAL AN N JANAE Past Medical History:  
Diagnosis Date  IBS (irritable bowel syndrome)  Infertility  Psychiatric problem   
 depression on meds now, anorexia in past  
 
Past Surgical History:  
Procedure Laterality Date   DELIVERY ONLY    
  for macrosomia(9-13)  HX HEMORRHOIDECTOMY Social History Occupational History  Not on file Tobacco Use  Smoking status: Never Smoker  Smokeless tobacco: Never Used Substance and Sexual Activity  Alcohol use: No  
 Drug use: No  
 Sexual activity: Yes  
  Partners: Male Birth control/protection: None Family History Problem Relation Age of Onset  Asthma Brother  Hypertension Maternal Grandfather No Known Allergies Prior to Admission medications Medication Sig Start Date End Date Taking? Authorizing Provider  
sertraline (ZOLOFT) 100 mg tablet TAKE ONE TABLET BY MOUTH ONCE DAILY 3/7/19  Yes Toño Woodard MD  
CALCIUM-MAGNESIUM PO Take  by mouth. Yes Provider, Historical  
PNV No12-Iron-FA-DSS-OM-3 29 mg iron-1 mg -50 mg CPKD Take  by mouth. Yes Provider, Historical  
ferrous sulfate 325 mg (65 mg iron) tablet Take 1 Tab by mouth two (2) times daily (with meals). 2/12/19   Toño Woodard MD  
magnesium 250 mg tab Take  by mouth. Provider, Historical  
acetaminophen (TYLENOL EXTRA STRENGTH) 500 mg tablet Take  by mouth every six (6) hours as needed for Pain. Provider, Historical  
butalbital-acetaminophen-caffeine (FIORICET, ESGIC) -40 mg per tablet Take 1 Tab by mouth every six (6) hours as needed for Pain. 11/21/18   Jesse Wu DO  
senna-docusate (PERICOLACE) 8.6-50 mg per tablet Take 1 Tab by mouth daily. 12/3/15   Bekah Shukla MD  
polyethylene glycol (MIRALAX) 17 gram packet Take 1 Packet by mouth daily. 11/30/15   Bekah Shukla MD  
  
 
Review of Systems: A comprehensive review of systems was negative except for that written in the History of Present Illness. Objective:  
 
Vitals:   
Vitals:  
 04/20/19 2034 BP: 106/65 Pulse: 74 No data recorded. BP  Min: 106/65  Max: 106/65 Physical Exam: 
Patient without distress. Heart: Regular rate and rhythm Lung: clear to auscultation throughout lung fields, no wheezes, no rales, no rhonchi and normal respiratory effort Abdomen: soft, nontender Fundus: soft and non tender Cervical Exam 
Dilation (cm): 1 Eff: 50 % Station: -3 Position: Posterior Cervical Consistency: Moderate Vaginal exam done by? : Anali Brady RN Membrane Status: SROM Lower Extremities: no LE edema Uterine Activity:  Contractions q8mins Fetal Heart Rate:  Reactive Baseline: 145 per minute Variability: moderate Accelerations: yes Lab/Data Review: 
Recent Results (from the past 24 hour(s)) PH BY NITRAZINE, POC Collection Time: 19  8:40 PM  
Result Value Ref Range pH-Nitrazine paper 7.5 (A) 5.0 - 6.1 Daily QC performed? Yes Assessment and Plan: Ms. Patricia Luna is a 34 y.o.  female with an estimated gestational age of 43w3d who is admitted for premature labor. PNL: A+, Ab screen neg, rubella + VZV immune, HepBsAg, HIV, T pall neg, GC/CT neg, pap NILM/HPV neg. 1hr GTT wnl. +GBS. Hgb 10.8. Anatomy scan wnl, posterior placenta. SIUP @ 36w3d w/ Hx 2x  Nitrizine + (pH 7.5) - Admit to L&D; plan TOLAC 
- Continuous FHT + Electra 
- Pt does not know gender of baby; if male, + circ. - Plans to breast feed GBS+ 
- Intrapartum penicillin G; LD + q4hrs Depression + Hx Anorexia - Stable - Continue zoloft 100mg post-partum Hx Migraines Previously improved w/ magnesium. Pt had mild headache (at [facility]) today, relieved by tylenol Anemia in Pregnancy Hgb 10.8 (19). - Continue PNV + iron post-partum Varicose Veins w/ Hx RLE Swelling in Pregnancy RLE doppler neg DVT Patient discussed with Dr. Rian Thomas, [facility] Hospitalist Attending Arlette Ramirez MD 
Family Medicine Resident

## 2019-04-21 NOTE — L&D DELIVERY NOTE
Patient: Jeanna Brood                   Delivery Summary    Circumcision:   NA-female    Information for the patient's :  Adamaris Ronnell [[id number]]     Delivery Type:     Delivery Date: 2019   Delivery Time: 2:26 PM     Birth Weight:       Sex:  female  Delivery Clinician:      Gestational Age: 37w2d    Presentation:   vertex  Position:             Apgars were 7  and   9    Resuscitation Method:       Meconium Stained:      Living Status:         Placenta Date/Time:     Placenta Removal:     Placenta Appearance:      Cord Information:      Cord Events:         Disposition of Cord Blood:      Blood Gases Sent?:        Cord pH:  N/A      Episiotomy: none  Laceration(s):   mary jo urethral laceration  Estimated Blood Loss (ml):     Labor Events  Method:      Augmentation: pitocinCervical Ripening:        Induction  no     Additional Delivery Comments  none  Information for the patient's :  Adamaris Bagleyclarisa [[id number]]   Shoulder Dystocia Details:       Affected Side:        Date and Time Recognized:        Help Called By:   at        Physician/Provider:   arrived at        NICU Staff:   arrived at        Additional Staff Arrived:   at        Gentle Attempt at Traction:     If no, why:        First Maneuver:   at   by        [facility]:   at   by        Third Maneuver:   at   by

## 2019-04-21 NOTE — PROGRESS NOTES
Bedside and Verbal shift change report given to Albert Phillips (oncoming nurse) by Marv MCLAUGHLIN (offgoing nurse). Report included the following information SBAR, Kardex, Intake/Output, MAR and Recent Results.

## 2019-04-21 NOTE — ANESTHESIA PREPROCEDURE EVALUATION
Relevant Problems No relevant active problems Anesthetic History No history of anesthetic complications Review of Systems / Medical History Patient summary reviewed Pulmonary Within defined limits Neuro/Psych Psychiatric history Comments: Anxiety/depression Cardiovascular Within defined limits Exercise tolerance: >4 METS 
  
GI/Hepatic/Renal 
Within defined limits Endo/Other Within defined limits Other Findings Comments:  Physical Exam 
 
Airway Mallampati: III Neck ROM: normal range of motion Mouth opening: Normal 
 
 Cardiovascular Rhythm: regular Rate: normal 
 
 
 
 Dental 
No notable dental hx Pulmonary Breath sounds clear to auscultation Abdominal 
 
 
 
 Other Findings Anesthetic Plan ASA: 2 Anesthesia type: spinal and epidural 
 
 
 
 
 
Anesthetic plan and risks discussed with: Patient Informed consent obtained.

## 2019-04-21 NOTE — PROGRESS NOTES
arrive c/o leaking fluid around 5:46 pm today, reports fetal movement, denies any bleeding - Received verbal order for patient to ambulate Dr. Venus Rowland - Pt up to ambulate

## 2019-04-21 NOTE — PROGRESS NOTES
8:08 AM 
Bedside and Verbal shift change report given to Vinny Keenan RN (oncoming nurse) by Mitch Acosta RN (offgoing nurse). Report included the following information SBAR, Kardex, Procedure Summary, Intake/Output, MAR, Recent Results, Med Rec Status, Pre Procedure Checklist and Procedure Verification. Assumed care of the pt.  
 
9:08 AM 
Dr. Luz Marina Herrera in room to assess the pt and to discuss the plan of care with the pt and fob. Patient to begin on pitocin IV. 
 
09:11AM 
Pitocin augmentation began. 13:31 PM 
Nitrous oxide for pain management, as was requested by the pt. 14:08PM  
Dr. Brooke Chen in room to assess the pt, as pt requesting for an epidural.  
 
14:14PM 
Patient reports feeling increased pressure. SVE performed and pt is 10/100/+2. Dr. Luz Marina Herrera at [facility] and was made aware immediately. Patient continues with nitrous for pain management. 14:29 PM 
 of viable baby girl by Dr. Luz Marina Herrera at 14:26PM, cry heard on delivery. Apgars 7/9. 
 
16:45PM 
QBL 345ml. 18:27 PM 
TRANSFER - OUT REPORT: 
 
Verbal telephone report given to Aaron Boxer, RN(name) on Dia Fabry  being transferred to MIU(unit) for routine progression of care Report consisted of patients Situation, Background, Assessment and  
Recommendations(SBAR). Information from the following report(s) SBAR, Kardex, Procedure Summary, Intake/Output, MAR, Recent Results and Med Rec Status was reviewed with the receiving nurse. Lines:  
Peripheral IV 19 Anterior;Right Forearm (Active) Site Assessment Clean, dry, & intact 2019  8:13 AM  
Phlebitis Assessment 0 2019  8:13 AM  
Infiltration Assessment 0 2019  8:13 AM  
Dressing Status Clean, dry, & intact 2019  8:13 AM  
Dressing Type Transparent 2019  8:13 AM  
Hub Color/Line Status Flushed;Patent;Capped 2019  8:13 AM  
Action Taken Open ports on tubing capped 2019  8:13 AM  
Alcohol Cap Used Yes 2019  8:13 AM  
  
 
 Opportunity for questions and clarification was provided. Patient transported with all belongings and with baby via open crib with KRISTEN Beckham RN: 
 Registered Nurse Tech

## 2019-04-21 NOTE — PROGRESS NOTES
Labor Progress Note Patient seen, fetal heart rate and contraction pattern evaluated, patient examined. Patient Vitals for the past 1 hrs: 
 SpO2  
19 0805 97 % 19 0800 98 % 19 0755 99 % 19 0750 98 % Physical Exam: 
Cervical Exam:  Cervical Exam 
Dilation (cm): 1(1-2) Eff: 50 % Station: -3 Position: Posterior Cervical Consistency: Moderate Vaginal exam done by? : Dr. Rufus Sol Baby Position: Vertex Membrane Status: PROM Membranes:   Premature Rupture of Membranes; Amniotic Fluid: clear fluid Uterine Activity: None Fetal Heart Rate: Baseline: 130 per minute Variability: moderate Accelerations: yes Assessment/Plan: 
Blaine Zheng is a 34 y.o. [id number] at 36w4d admitted for PPROM 
 
SIUP @ 36w3d w/ Hx 2x : nitrazine positive on admission, plan TOLAC 
- Continuous fetal heart monitoring and tocometry 
- Start Pitocin  
- IOB labs: A+, Ab screen neg, rubella + VZV immune, HepBsAg, HIV, T pall neg, GC/CT neg, pap NILM/HPV neg. 1hr GTT wnl. +GBS. Hgb 10.8.  
- Anatomy scan (): wnl, posterior placenta - Pt does not know gender of baby; if male, + circ. - Plans to breast feed 
  
GBS+ 
- Continue intrapartum penicillin G  
  
Depression + Hx Anorexia - Stable - Continue zoloft 100mg post-partum 
  
Hx Migraines: Previously improved w/ magnesium. Pt had mild headache (at [facility]) on admission, relieved by tylenol 
-Continue Tylenol 650mg q4hrs prn Anemia in Pregnancy Hgb 10.8 (19). - Continue PNV + iron post-partum 
  
Varicose Veins w/ Hx RLE Swelling in Pregnancy RLE doppler neg DVT Patient discussed with Dr. Britt Okeefe [facility] Hospitalist) Ria Kowalski MD 
Family Medicine Resident

## 2019-04-21 NOTE — L&D DELIVERY NOTE
Delivery Summary    Patient: Gasper Christopher MRN: [id number]  SSN: [id number]    YOB: 1985  Age: 34 y.o. Sex: female       Information for the patient's :  Rayne Garcia [[id number]]       Labor Events:    Labor: No    Steroids: None   Cervical Ripening Date/Time:       Cervical Ripening Type: None   Antibiotics During Labor: Yes   Rupture Identifier:      Rupture Date/Time: 2019 5:46 PM   Rupture Type: PROM;SROM   Amniotic Fluid Volume:      Amniotic Fluid Description: Clear    Amniotic Fluid Odor: None    Induction:         Induction Date/Time:        Indications for Induction:      Augmentation:     Augmentation Date/Time:      Indications for Augmentation:     Labor complications: Additional complications:        Delivery Events:  Indications For Episiotomy:     Episiotomy:     Perineal Laceration(s):     Repaired:     Periurethral Laceration Location:      Repaired:     Labial Laceration Location:     Repaired:     Sulcal Laceration Location:     Repaired:     Vaginal Laceration Location:     Repaired:     Cervical Laceration Location:     Repaired:     Repair Suture:     Number of Repair Packets:     Estimated Blood Loss (ml):  ml     Delivery Date: 2019    Delivery Time: 2:26 PM  Delivery Type:    Sex:  Female    Gestational Age: 37w2d   Delivery Clinician:     Living Status:     Delivery Location:              APGARS  One minute Five minutes Ten minutes   Skin color:            Heart rate:            Grimace:            Muscle tone:            Breathing: Totals:                Presentation:      Position:        Resuscitation Method:        Meconium Stained:        Cord Information:    Complications:    Cord around:    Delayed cord clamping?     Cord clamped date/time:   Disposition of Cord Blood:      Blood Gases Sent?:      Placenta:  Date/Time:    Removal:        Appearance:        Measurements:  Birth Weight: Birth Length:        Head Circumference:        Chest Circumference:       Abdominal Girth: Other Providers:   ;;;;;;;;;;Nighat Journey, Obstetrician;Primary Nurse;Primary [place] Nurse;Nicu Nurse;Neonatologist;Anesthesiologist;Crna;Nurse Practitioner;Midwife;Nursery Nurse;Resident           Group B Strep:   Lab Results   Component Value Date/Time    GrBStrep, External positive in urine 10/19/2018     Information for the patient's :  Shivani Proctor [[id number]]   No results found for: ABORH, PCTABR, PCTDIG, BILI, ABORHEXT, ABORH    No results for input(s): PCO2CB, PO2CB, HCO3I, SO2I, IBD, PTEMPI, SPECTI, PHICB, ISITE, IDEV, IALLEN in the last 72 hours.

## 2019-04-22 PROBLEM — Z87.59 HISTORY OF PRETERM PREMATURE RUPTURE OF MEMBRANES (PPROM): Status: ACTIVE | Noted: 2019-04-22

## 2019-04-22 PROCEDURE — 74011250637 HC RX REV CODE- 250/637: Performed by: STUDENT IN AN ORGANIZED HEALTH CARE EDUCATION/TRAINING PROGRAM

## 2019-04-22 PROCEDURE — 65270000029 HC RM PRIVATE

## 2019-04-22 RX ORDER — IBUPROFEN 800 MG/1
800 TABLET ORAL
Qty: 30 TAB | Refills: 0 | Status: SHIPPED | OUTPATIENT
Start: 2019-04-22 | End: 2021-07-14

## 2019-04-22 RX ORDER — SERTRALINE HYDROCHLORIDE 50 MG/1
100 TABLET, FILM COATED ORAL DAILY
Status: DISCONTINUED | OUTPATIENT
Start: 2019-04-22 | End: 2019-04-23 | Stop reason: HOSPADM

## 2019-04-22 RX ADMIN — IBUPROFEN 800 MG: 800 TABLET ORAL at 00:31

## 2019-04-22 RX ADMIN — IBUPROFEN 800 MG: 800 TABLET ORAL at 15:27

## 2019-04-22 RX ADMIN — SERTRALINE HYDROCHLORIDE 100 MG: 50 TABLET ORAL at 08:54

## 2019-04-22 RX ADMIN — IBUPROFEN 800 MG: 800 TABLET ORAL at 08:54

## 2019-04-22 RX ADMIN — Medication 1 TABLET: at 08:54

## 2019-04-22 NOTE — DISCHARGE SUMMARY
Obstetrical Discharge Summary     Name: Catrachito Fowler MRN: [id number]  SSN: [id number]    YOB: 1985  Age: 34 y.o. Sex: female      Admit Date: 2019    Discharge Date: 2019     Admitting Physician: Sunshine Sheridan DO     Attending Physician:  John Jewell DO     Admission Diagnoses: Pregnancy [Z34.90]    Discharge Diagnoses:   Information for the patient's :  Nina Foley [[id number]]   Delivery of a 3.48 kg female infant via Vaginal, Spontaneous on 2019 at 2:26 PM  by . Apgars were 7 and 9. Additional Diagnoses:   Hospital Problems  Date Reviewed: 3/27/2019          Codes Class Noted POA    History of  premature rupture of membranes (PPROM) ICD-10-CM: Z87.59  ICD-9-CM: V13.29  2019 Unknown        Pregnancy ICD-10-CM: Z34.90  ICD-9-CM: V22.2  2019 Unknown             Lab Results   Component Value Date/Time    Rubella, External immune 10/19/2018    GrBStrep, External positive in urine 10/19/2018       Immunization(s):   Immunization History   Administered Date(s) Administered    Influenza Vaccine Split 10/11/2012    Tdap 2015, 2019        Hospital Course:   Patient is a 34 y.o. .  s/p  at 36 weeks 4 days.  Presented for PPROM in setting of contractions and LOF. Pt has 2 successful VBACs after . Pregnancy has been complicated by GBS+ bacteruria, anemia, migraines, depression with anxiety and hx anorexia, varicose veins, UTI, and nausea/vomiting of pregnancy. Labor was uncomplicated. Delivered TLFI by  without complications.  Normal hospital course following the delivery.  On day of discharge patient reported minimal lochia, well controlled pain, and no other complaints.  Discharged with pain regimen and bowel regimen. Advised to continue prenatal vitamins.  EPDS score of 10. No SI/HI. Follow up with PCP in 2 weeks and 6 weeks.     Condition at Discharge:  Stable  Disposition: Discharge to Home    Physical exam:  Visit Vitals  BP 98/56 (BP 1 Location: Right arm, BP Patient Position: At rest)   Pulse (!) 58   Temp 96.4 °F (35.8 °C)   Resp 18   LMP 08/08/2018 (Approximate)   SpO2 100%   Breastfeeding? Unknown       Exam:  Patient without distress. CTAB, no w/r/r/c               RRR, + S1 and S2, no m/r/g               Abdomen soft, fundus firm at level of umbilicus, non tender               Perineum with normal lochia noted. Lower extremities are negative for swelling, cords or tenderness. Patient Instructions:   Current Discharge Medication List      CONTINUE these medications which have NOT CHANGED    Details   sertraline (ZOLOFT) 100 mg tablet TAKE ONE TABLET BY MOUTH ONCE DAILY  Qty: 30 Tab, Refills: 6    Comments: Please consider 90 day supplies to promote better adherence      CALCIUM-MAGNESIUM PO Take  by mouth. PNV No12-Iron-FA-DSS-OM-3 29 mg iron-1 mg -50 mg CPKD Take  by mouth. ferrous sulfate 325 mg (65 mg iron) tablet Take 1 Tab by mouth two (2) times daily (with meals). Qty: 60 Tab, Refills: 5      magnesium 250 mg tab Take  by mouth. acetaminophen (TYLENOL EXTRA STRENGTH) 500 mg tablet Take  by mouth every six (6) hours as needed for Pain. butalbital-acetaminophen-caffeine (FIORICET, ESGIC) -40 mg per tablet Take 1 Tab by mouth every six (6) hours as needed for Pain. Qty: 30 Tab, Refills: 0    Associated Diagnoses: Pregnancy headache in second trimester      senna-docusate (PERICOLACE) 8.6-50 mg per tablet Take 1 Tab by mouth daily. Qty: 30 Tab, Refills: 1    Associated Diagnoses: Other constipation      polyethylene glycol (MIRALAX) 17 gram packet Take 1 Packet by mouth daily. Qty: 30 Packet, Refills: 3    Associated Diagnoses: Other constipation             Reference my discharge instructions.     Follow-up Information     Follow up With Specialties Details Why Contact Orlando Langford,  Family Practice Go on 5/31/2019 10:15am for 6 wks Postpartum visit  [address] [place] [id number] [facility],  Family Practice Go on 5/10/2019 9am for 2wks Postpartum visit [facility] [place] [address]  [phone]              Signed By:  Bekah Bañuelos MD    Family Medicine Resident

## 2019-04-22 NOTE — LACTATION NOTE
This note was copied from a baby's chart. Mother resting in bed BF baby. This is parents 4th child. BF basics reviewed. Discussed with mother her plan for feeding. Reviewed the benefits of exclusive breast milk feeding during the hospital stay. Informed her of the risks of using formula to supplement in the first few days of life as well as the benefits of successful breast milk feeding; referred her to the Breastfeeding booklet about this information. She acknowledges understanding of information reviewed and states that it is her plan to breastfeed her infant. Will support her choice and offer additional information as needed. Reviewed breastfeeding basics:  How milk is made and normal  breastfeeding behaviors discussed. Supply and demand,  stomach size, early feeding cues, skin to skin bonding with comfortable positioning and baby led latch-on reviewed. How to identify signs of successful breastfeeding sessions reviewed; education on assymetrical latch, signs of effective latching vs shallow, in-effective latching, normal  feeding frequency and duration and expected infant output discussed. Normal course of breastfeeding discussed including the AAP's recommendation that children receive exclusive breast milk feedings for the first six months of life with breast milk feedings to continue through the first year of life and/or beyond as complimentary table foods are added. Breastfeeding Booklet and Warm line information provided with discussion. Discussed typical  weight loss and the importance of pediatrician appointment within 24-48 hours of discharge, at 2 weeks of life and normalcy of requesting pediatric weight checks as needed in between visits.  
 
Pt will successfully establish breastfeeding by feeding in response to early feeding cues  
or wake every 3h, will obtain deep latch, and will keep log of feedings/output. Taught to BF at hunger cues and or q 2-3 hrs and to offer 10-20 drops of hand expressed colostrum at any non-feeds. Breast Assessment Left Breast: Medium Left Nipple: Everted, Intact Right Breast: Medium Right Nipple: Everted, Intact Breast- Feeding Assessment Attends Breast-Feeding Classes: No 
Breast-Feeding Experience: Yes Breast Trauma/Surgery: No 
Type/Quality: Good Lactation Consultant Visits Breast-Feedings: Good Mother/Infant Observation Mother Observation: Breast comfortable, Recognizes feeding cues Infant Observation: Rhythmic suck, Relaxed after feeding, Opens mouth, Lips flanged, upper, Lips flanged, lower, Latches nipple and aereolae, Feeding cues LATCH Documentation Latch: Grasps breast, tongue down, lips flanged, rhythmic sucking Audible Swallowing: A few with stimulation Type of Nipple: Everted (after stimulation) Comfort (Breast/Nipple): Soft/non-tender Hold (Positioning): No assist from staff, mother able to position/hold infant LATCH Score: 9

## 2019-04-22 NOTE — ROUTINE PROCESS
Bedside and Verbal shift change report given to MAKSIM Zamora (oncoming nurse) by Tonio Lee RN (offgoing nurse). Report included the following information SBAR, Kardex, Procedure Summary, Intake/Output, MAR and Recent Results.

## 2019-04-22 NOTE — ROUTINE PROCESS
Bedside and Verbal shift change report given to ALDEN Hurt (oncoming nurse) by Ruby Li RN (offgoing nurse). Report included the following information SBAR, Intake/Output, MAR and Med Rec Status.

## 2019-04-22 NOTE — PROGRESS NOTES
Post-Partum Day Number 1 Progress Note Patient doing well post-partum without significant complaint. Pain well controlled. Lochia moerate. Tolerating PO diet. Ambulating. Voiding without difficulty. + flatus. no BM. Breastfeeding fine. No acute concerns. Vitals:   
Patient Vitals for the past 8 hrs: 
 BP Temp Pulse Resp  
19 0434 92/52 98.2 °F (36.8 °C) 69 14 Temp (24hrs), Av °F (36.7 °C), Min:97.8 °F (36.6 °C), Max:98.4 °F (36.9 °C) Exam:  Patient without distress. CTAB, no w/r/r/c. 
             RRR, +S1 and S2, no m/r/g. Abdomen soft, fundus firm at level of umbilicus, nontender. Lower extremities:  No edema. No palpable cords or tenderness. Lab/Data Review: No results found for this or any previous visit (from the past 12 hour(s)). Assessment and Plan:   
Alejandro Alfonso is a 34 y.o.  s/p  at 36 weeks 4 days. Patient appears to be having uncomplicated post-partum course. 1. Continue routine perineal care and maternal education. 2. Plan discharge tomorrow if no problems occur. 3. Depression with anxiety. Continue Zoloft 100mg daily. Patient seen and discussed with Dr. Chelsea Mclaughlin (Attending). Louann Epstein MD 
Family Medicine Resident

## 2019-04-23 VITALS
OXYGEN SATURATION: 100 % | HEART RATE: 73 BPM | DIASTOLIC BLOOD PRESSURE: 55 MMHG | TEMPERATURE: 97.9 F | SYSTOLIC BLOOD PRESSURE: 103 MMHG | RESPIRATION RATE: 16 BRPM

## 2019-04-23 PROCEDURE — 74011250637 HC RX REV CODE- 250/637: Performed by: STUDENT IN AN ORGANIZED HEALTH CARE EDUCATION/TRAINING PROGRAM

## 2019-04-23 RX ADMIN — IBUPROFEN 800 MG: 800 TABLET ORAL at 07:39

## 2019-04-23 RX ADMIN — SERTRALINE HYDROCHLORIDE 100 MG: 50 TABLET ORAL at 08:50

## 2019-04-23 RX ADMIN — Medication 1 TABLET: at 08:50

## 2019-04-23 NOTE — LACTATION NOTE
This note was copied from a baby's chart. Mom states breast feeding going very well. This is mom's fourth child and breast fed all of them. Discussed breast feeding discharge information. Breast Feeding Discharge Information Chart shows numerous feedings, void, stool WNL. Discussed Importance of monitoring outputs and feedings on first week of  Breastfeeding. Discussed ways to tell if baby getting enough, ie  Voids and stools, by day 7, baby should have at least  4-6 wet diapers a day, change in color of stool to a seedy yellow, and return to birth wt within 2 weeks with a steady increase after that. .  Follow up with pediatrician visit for weight check in 1-2 days reviewed. Discussed Breast feeding support groups and encouraged to call Warm line number, [phone]  for any breast feeding questions or problems that arise. Please leave a message and let us know what is going on. We will return your call within 24 hours. Feedings Encouraged mom to attempt feeding with baby led feeding cues. Just as sucking on fingers, rooting, mouthing. Looking for 8-12 feedings in 24 hours. Don't limit baby at breast, allow baby to come off breast on it's own. Baby may want to feed  often and may increase number of feedings on second day of life. Skin to skin encouraged. In 4-6 weeks, baby may go though a growth spurt and increase feedings for several days to increase your milk supply. If baby doesn't nurse,  Mom should Pump or hand express drops, 12-18 drops, and give infant any expressed milk. If not pumping any milk, mom should contact pediatrician for possible need for supplementation. MOM's DIET Discussed eating a healthy diet. Instructed mother to eat a variety of foods in order to get a well balanced diet.  She should consume an extra 300-500 calories per day (more than her non-pregnant requirement.) These extra calories will help provide energy needed for optimal breast milk production. Mother also encouraged to \"drink to thirst\" and it is recommended that she drink fluids such as water and fruit/vegetable juice. Nutritious snacks should be available so that she can eat throughout the day to help satisfy her hunger and maintain a good milk supply. Continue taking your Prenatal vitamins as long as you breast feed. Engorgement Care Guidelines:  Anticipatory guidance shared. If breast become engorged, to help decrease engorgement. Frequent breastfeeding encouraged, cool packs around breast after nursing may help. May take motrin or Ibuprofen as ordered by your Doctor. Call your doctor, midwife and/or lactation consultant if:  
? Baby is having no wet or dirty diapers ? Baby has dark colored urine after day 3 
(should be pale yellow to clear) ? Baby has dark colored stools after day 4 (should be mustard yellow, with no meconium) ? Baby has fewer wet/soiled diapers or nurses less  
frequently than the goals listed here ? Mom has symptoms of mastitis  
(sore breast with fever, chills, flu-like aching)

## 2019-04-23 NOTE — ROUTINE PROCESS
Bedside and Verbal shift change report given to Travon Granados RN (oncoming nurse) by Stanislav Christensen RN (offgoing nurse). Report included the following information SBAR, Intake/Output, MAR and Recent Results.

## 2019-04-23 NOTE — DISCHARGE INSTRUCTIONS
Patient Discharge Instructions    Alexey Lyon / [id number] : 1985    Admitted 2019 Discharged: 2019     Please bring this form with you to show your care provider at your follow-up appointment. Primary care provider:  William Luz DO    Discharging provider:  Isaias Galicia MD  - Family Medicine Resident  Terrie Johnson MD - OB Attending   ACUTE DIAGNOSES:  Pregnancy [Z34.90]    FOLLOW-UP CARE RECOMMENDATIONS:    Follow-up Information     Follow up With Specialties Details Why Contact Info    William Luz DO Family Practice Go on 2019 10:15am for 6 wks Postpartum visit  [address] [address], [facility],  Family Practice Go on 5/10/2019 9am for 2wks Postpartum visit [address] [address]  [phone]          You are scheduled for a 2wk and 6 wk post-partum follow up. Please schedule a visit sooner    Continuing Therapy:  - Please continue Motrin 800 mg (1 tablet) every 8 hours for the next 2 days, then 800mg (1 tablet) every 8 hours as needed for pain  - Please continue Colace 100 mg (1 tablet) twice a day for constipation until having regular bowel movement  - Please continue your prenatal vitamins daily      Follow-up tests needed: None    Pending test results: At the time of your discharge the following test results are still pending: None. Specific symptoms to watch for: chest pain, shortness of breath, fever, chills, nausea, vomiting, diarrhea, change in mentation, falling, weakness, bleeding. DIET/what to eat:  Regular Diet    ACTIVITY:   Activity Instructions    Do not lift anything heavier than your baby for 6 weeks. Nothing in the vagina for 6 weeks. You are ok to drive as long as you are not taking Percocet or other narcotic pain medication (Motrin is ok). Wound care: n/a    I understand that if any problems occur once I am at home I am to contact my physician.     These instructions were explained to me and I had the opportunity to ask questions. I understand and acknowledge receipt of the instructions indicated above. [employer] Activation    Thank you for requesting access to eSnips. Please follow the instructions below to securely access and download your online medical record. eSnips allows you to send messages to your doctor, view your test results, renew your prescriptions, schedule appointments, and more. How Do I Sign Up? 1. In your internet browser, go to [email or web address]  2. Click on the First Time User? Click Here link in the Sign In box. You will be redirect to the New Member Sign Up page. 3. Enter your eSnips Access Code exactly as it appears below. You will not need to use this code after youve completed the sign-up process. If you do not sign up before the expiration date, you must request a new code. eSnips Access Code: Activation code not generated  Current eSnips Status: Active (This is the date your eSnips access code will )    4. Enter the last four digits of your Social Security Number (xxxx) and Date of Birth (mm/dd/yyyy) as indicated and click Submit. You will be taken to the next sign-up page. 5. Create a eSnips ID. This will be your eSnips login ID and cannot be changed, so think of one that is secure and easy to remember. 6. Create a eSnips password. You can change your password at any time. 7. Enter your Password Reset Question and Answer. This can be used at a later time if you forget your password. 8. Enter your e-mail address. You will receive e-mail notification when new information is available in [address] 9. Click Sign Up. You can now view and download portions of your medical record. 10. Click the Download Summary menu link to download a portable copy of your medical information.     Additional Information    If you have questions, please visit the Frequently Asked Questions section of the eSnips website at [email or web address] Remember, [employer] is NOT to be used for urgent needs. For medical emergencies, dial 911.

## 2019-04-29 NOTE — PROGRESS NOTES
32yo [id number] @ 35w1d by LMP=10.2 wk sono   1. IUP: IOB labs reviewed, declines aneuploidy screening this pregnancy. GBS pos urine. GTT WNL. 2.  NVP: slowly improving. Weight gain is good. 3.  Hx section with 2 subsequent : plan will be for TOLAC this pregnancy. 4.  Depression and hx anorexia: stable on Zoloft 100mg daily, on this for a long time including previous pregnancies. Recommend close f/u PP for depression screening. 5.  Headache: dx with migraines previously. Improved with magnesium. 6.  UTI: MT neg   7. Varicose vein: no DVT on doppler recently, support hose. 8.  Anemia: mild on iron. Will plan to recheck hbg at an upcoming appt.

## 2019-07-24 RX ORDER — SERTRALINE HYDROCHLORIDE 100 MG/1
TABLET, FILM COATED ORAL
Qty: 30 TAB | Refills: 0 | Status: SHIPPED | OUTPATIENT
Start: 2019-07-24 | End: 2020-03-23

## 2019-07-24 NOTE — TELEPHONE ENCOUNTER
Per Dr. Ankit Sweet to refill one month supply as she needs an appointment for further refills. Will send [employer]en message to patient.

## 2019-07-24 NOTE — TELEPHONE ENCOUNTER
[phone]      Patient took the last pill yesterday and went to the pharmacy of which they told her she had no refills left. Please advise.

## 2020-03-19 RX ORDER — SERTRALINE HYDROCHLORIDE 100 MG/1
TABLET, FILM COATED ORAL
Qty: 30 TAB | Refills: 0 | OUTPATIENT
Start: 2020-03-19

## 2020-03-23 NOTE — TELEPHONE ENCOUNTER
Dr Alexander Mckeon   Received: 3 days ago   Message Contents   Juan, [address] [address]             Medication Refill     Caller (if not patient):       Relationship of caller (if not patient):       Best contact number(s):[phone]       Name of medication and dosage if known: \"Sertraline 100 mg\"       Is patient out of this medication (yes/no):no       Pharmacy name:[facility], [address] listed in chart? (yes/no):   Pharmacy phone [phone]       Details to clarify the request:Rx is out of refills, pt needs a new Rx.        Jim Caicedo

## 2020-03-23 NOTE — TELEPHONE ENCOUNTER
Patient request to be contacted about denial. Pt states she need her medication and doesn't want to come in office do to [address].     CALL PT/ [phone]

## 2020-06-01 RX ORDER — SERTRALINE HYDROCHLORIDE 100 MG/1
TABLET, FILM COATED ORAL
Qty: 30 TAB | Refills: 0 | Status: SHIPPED | OUTPATIENT
Start: 2020-06-01 | End: 2020-07-10 | Stop reason: SDUPTHER

## 2020-07-07 ENCOUNTER — PATIENT MESSAGE (OUTPATIENT)
Dept: FAMILY MEDICINE CLINIC | Age: 35
End: 2020-07-07

## 2020-07-07 RX ORDER — SERTRALINE HYDROCHLORIDE 100 MG/1
TABLET, FILM COATED ORAL
Qty: 30 TAB | Refills: 0 | OUTPATIENT
Start: 2020-07-07

## 2020-07-10 ENCOUNTER — VIRTUAL VISIT (OUTPATIENT)
Dept: FAMILY MEDICINE CLINIC | Age: 35
End: 2020-07-10

## 2020-07-10 DIAGNOSIS — F32.A ANXIETY AND DEPRESSION: Primary | ICD-10-CM

## 2020-07-10 DIAGNOSIS — F41.9 ANXIETY AND DEPRESSION: Primary | ICD-10-CM

## 2020-07-10 PROBLEM — Z34.90 PREGNANCY: Status: RESOLVED | Noted: 2019-04-20 | Resolved: 2020-07-10

## 2020-07-10 PROBLEM — Z87.59 HISTORY OF PRETERM PREMATURE RUPTURE OF MEMBRANES (PPROM): Status: RESOLVED | Noted: 2019-04-22 | Resolved: 2020-07-10

## 2020-07-10 RX ORDER — SERTRALINE HYDROCHLORIDE 100 MG/1
50 TABLET, FILM COATED ORAL DAILY
Qty: 30 TAB | Refills: 0 | Status: SHIPPED | OUTPATIENT
Start: 2020-07-10 | End: 2020-07-10

## 2020-07-10 RX ORDER — SERTRALINE HYDROCHLORIDE 100 MG/1
50 TABLET, FILM COATED ORAL DAILY
Qty: 30 TAB | Refills: 0 | Status: SHIPPED | OUTPATIENT
Start: 2020-07-10 | End: 2020-11-30 | Stop reason: SDUPTHER

## 2020-07-10 NOTE — PROGRESS NOTES
Joelle Sherwood  30 y.o. female  1985  [address] [place] [address],B-1  [id number]   [place] [facility]:    Telemedicine Progress Note  Sandra Holguin MD       Encounter Date and Time: July 10, 2020 at 7:39 AM    Consent:  She and/or the health care decision maker is aware that that she may receive a bill for this telephone service, depending on her insurance coverage, and has provided verbal consent to proceed: Yes    Chief Complaint   Patient presents with    Anxiety     History of Present Illness   Joelle Sherwood is a 30 y.o. female was evaluated by synchronous (real-time) audio-video technology from home, through the [facility] Patient Portal.    Anxiety:  Taking Setraline 100 mg once daily with good control  Patient needs refill  Patient is doing well, no concerns  At time of interview not feeling anxious or nervous  Denies depression, SI, headache, fatigue, CP, palpitation, SOB, abdominal pain, insomnia, nausea and vomiting. Review of Systems   Review of Systems   Constitutional: Negative for chills, fever and malaise/fatigue. Eyes: Negative for blurred vision. Respiratory: Negative for shortness of breath. Cardiovascular: Negative for chest pain and palpitations. Gastrointestinal: Negative for abdominal pain, diarrhea, heartburn, nausea and vomiting. Musculoskeletal: Negative for back pain and myalgias. Neurological: Negative for dizziness, weakness and headaches. Psychiatric/Behavioral: Negative for depression. The patient is not nervous/anxious and does not have insomnia.         Vitals/Objective:     General: alert, cooperative, no distress   Mental  status: mental status: alert, oriented to person, place, and time, normal mood, behavior, speech, dress, motor activity, and thought processes   Resp: resp: normal effort and no respiratory distress   Neuro: neuro: no gross deficits   Skin: skin: no discoloration or lesions of concern on visible areas Due to this being a TeleHealth evaluation, many elements of the physical examination are unable to be assessed. Assessment and Plan:   Time-based coding, delete if not needed: I spent at least 15 minutes with this established patient, and >50% of the time was spent counseling and/or coordinating care regarding Anxiety    1. Anxiety and depression: Well controlled on 100 mg of Zoloft. Interested in tapering. Will begin taper with close follow up. - GAD7 3  - PHQ2 negative  - Taper from 100 mg to 50 mg starting today (confirmed w/ [address])  - Close follow up virtual in 3-5 weeks, and continue to taper if stable  - Patient to message me through my chart at any point for concerns  - Declined referral to Psychology for CBT (reports good support system with [facility])  - sertraline (ZOLOFT) 100 mg tablet; Take 0.5 Tabs by mouth daily. Dispense: 30 Tab; Refill: 0    - Advised patient to schedule in clinic yearly exam in the next 1-2 months      Time spent in direct conversation with the patient to include medical condition(s) discussed, assessment and treatment plan: >15       We discussed the expected course, resolution and complications of the diagnosis(es) in detail. Medication risks, benefits, costs, interactions, and alternatives were discussed as indicated. I advised her to contact the office if her condition worsens, changes or fails to improve as anticipated. She expressed understanding with the diagnosis(es) and plan. Patient understands that this encounter was a temporary measure, and the importance of further follow up and examination was emphasized. Patient verbalized understanding. Patient informed to follow up: 2-4 weeks.     Electronically Signed: Chris Wright MD    Providers location when delivering service (clinic, hospital, home): Home      Pursuant to the emergency declaration under the [facility] [place], [address]5 waiver authority and the Coronavirus Preparedness and Response Supplemental Appropriations Act, this Virtual  Visit was conducted, with patient's consent, to reduce the patient's risk of exposure to COVID-19 and provide continuity of care for an established patient. Services were provided through a video synchronous discussion virtually to substitute for in-person clinic visit. History   Patients past medical, surgical and family histories were reviewed and updated.       Past Medical History:   Diagnosis Date    IBS (irritable bowel syndrome)     Infertility     Infertility, female     Psychiatric problem     depression on meds now, anorexia in past     Past Surgical History:   Procedure Laterality Date     DELIVERY ONLY       for macrosomia(9-13)    HX HEMORRHOIDECTOMY       Family History   Problem Relation Age of Onset    Asthma Brother     Hypertension Maternal Grandfather      Social History     Socioeconomic History    Marital status:      Spouse name: Not on file    Number of children: Not on file    Years of education: Not on file    Highest education level: Not on file   Occupational History    Not on file   Social Needs    Financial resource strain: Not on file    Food insecurity     Worry: Not on file     Inability: Not on file    Transportation needs     Medical: Not on file     Non-medical: Not on file   Tobacco Use    Smoking status: Never Smoker    Smokeless tobacco: Never Used   Substance and Sexual Activity    Alcohol use: No    Drug use: No    Sexual activity: Yes     Partners: Male     Birth control/protection: None   Lifestyle    Physical activity     Days per week: Not on file     Minutes per session: Not on file    Stress: Not on file   Relationships    Social connections     Talks on phone: Not on file     Gets together: Not on file     Attends Rastafari service: Not on file     Active member of club or organization: Not on file     Attends meetings of clubs or organizations: Not on file     Relationship status: Not on file    Intimate partner violence     Fear of current or ex partner: Not on file     Emotionally abused: Not on file     Physically abused: Not on file     Forced sexual activity: Not on file   Other Topics Concern     Service Not Asked    Blood Transfusions Not Asked    Caffeine Concern Not Asked    Occupational Exposure Not Asked    Hobby Hazards Not Asked    Sleep Concern Not Asked    Stress Concern Not Asked    Weight Concern Not Asked    Special Diet Not Asked    Back Care Not Asked    Exercise Not Asked    Bike Helmet Not Asked   [place] Not Asked    Self-Exams Not Asked   Social History Narrative    Not on file     Patient Active Problem List   Diagnosis Code    Anorexia R63.0    Depression with anxiety F41.8    Urinary urgency R39.15    Polydipsia R63.1    Varicose veins of both lower extremities I83.93    Irritable bowel syndrome with constipation K58.1    Pregnancy Z34.90    History of  premature rupture of membranes (PPROM) Z87.59          Current Medications/Allergies   Medications and Allergies reviewed:    Current Outpatient Medications   Medication Sig Dispense Refill    sertraline (ZOLOFT) 100 mg tablet Take 0.5 Tabs by mouth daily. 30 Tab 0    ibuprofen (MOTRIN) 800 mg tablet Take 1 Tab by mouth every eight (8) hours as needed for Pain for up to 30 doses. 30 Tab 0    ferrous sulfate 325 mg (65 mg iron) tablet Take 1 Tab by mouth two (2) times daily (with meals). 60 Tab 5    magnesium 250 mg tab Take  by mouth.  acetaminophen (TYLENOL EXTRA STRENGTH) 500 mg tablet Take  by mouth every six (6) hours as needed for Pain.  butalbital-acetaminophen-caffeine (FIORICET, ESGIC) -40 mg per tablet Take 1 Tab by mouth every six (6) hours as needed for Pain. 30 Tab 0    CALCIUM-MAGNESIUM PO Take  by mouth.  PNV No12-Iron-FA-DSS-OM-3 29 mg iron-1 mg -50 mg CPKD Take  by mouth.       senna-docusate (PERICOLACE) 8.6-50 mg per tablet Take 1 Tab by mouth daily. 30 Tab 1    polyethylene glycol (MIRALAX) 17 gram packet Take 1 Packet by mouth daily.  30 Packet 3     No Known Allergies

## 2020-07-11 NOTE — PROGRESS NOTES
[address] Medicine Residency Attending Addendum:  Dr. Cristina Shelton MD,  the patient and I were not physically present during this encounter. The resident and I are concurrently monitoring the patient care through appropriate telecommunication technology. I discussed the findings, assessment and plan with the resident and agree with the resident's findings and plan as documented in the resident's note.       Renetta Mixon MD

## 2020-08-31 ENCOUNTER — TELEPHONE (OUTPATIENT)
Dept: FAMILY MEDICINE CLINIC | Age: 35
End: 2020-08-31

## 2020-08-31 NOTE — TELEPHONE ENCOUNTER
Called patient. She is doing very well, denies any symptoms of depression or anxiety. Tolerated the recent taper of sertraline from 100 mg to 50 mg well. Would like to try to stop completely. Discussed risks vs benefits. Will proceed with stopping setraline at this time. Patient to monitor for symptoms these next two weeks and reach out to me for any concerns. Can restart 50 mg if unable to tolerate cessation of medication. Recommended patient to schedule follow up with me in 1 month.      ----- Message from Rena Tucker sent at 8/31/2020  9:07 AM EDT -----  Regarding: FW: Prescription Question  Contact: [phone]    ----- Message -----  From: Rodgers Krabbe: 8/31/2020   9:05 AM EDT  To: [facility]TD Nurse  Subject: Justo Harvey. I've been taking the 50mg of sertraline (half of my reg dose) since my virtual visit with you. I've been doing way better then I expected! I have 14 days worth of pills yet. What would you suggest after that? Thanks!   Evander Lundborg

## 2020-11-02 ENCOUNTER — TELEPHONE (OUTPATIENT)
Dept: FAMILY MEDICINE CLINIC | Age: 35
End: 2020-11-02

## 2020-11-02 NOTE — TELEPHONE ENCOUNTER
Called patient and she is having covid symptoms and abdominal pain. She states she has had exposure to covid and her friends tested positive who she has been around. Advised patient to seek urgent care to be tested for covid and have her symptoms evaluated. She verbalized understanding.

## 2020-11-02 NOTE — TELEPHONE ENCOUNTER
----- Message from Daryle Coop sent at 11/2/2020 12:20 PM EST -----  Regarding: /Level 1    Level 1/Escalated Issue      Caller's first and last name and relationship (if not the patient): Self      Best contact number(s): [phone]      What are the symptoms: Abdominal Pain      Transfer successful - yes/no (include outcome): No, advised to tell pt to go to [facility]      Transfer declined - yes/no (include reason): Yes due to no one available and no appointments      Was caller advised to seek appropriate level of care - yes/no: Yes      Details to clarify the request: Pt wanted to speak with Dr. Bri Villarreal regarding stomach pain at a level 8. Spoke with back line and advised to tell pt to seek appropriate care. Pt will wait for Dr. Bri Villarreal to call.          Daryle Coop

## 2020-11-30 ENCOUNTER — TELEPHONE (OUTPATIENT)
Dept: FAMILY MEDICINE CLINIC | Age: 35
End: 2020-11-30

## 2020-11-30 DIAGNOSIS — F32.A ANXIETY AND DEPRESSION: Primary | ICD-10-CM

## 2020-11-30 DIAGNOSIS — F41.9 ANXIETY AND DEPRESSION: Primary | ICD-10-CM

## 2020-11-30 DIAGNOSIS — F41.9 ANXIETY AND DEPRESSION: ICD-10-CM

## 2020-11-30 DIAGNOSIS — F32.A ANXIETY AND DEPRESSION: ICD-10-CM

## 2020-11-30 RX ORDER — SERTRALINE HYDROCHLORIDE 50 MG/1
50 TABLET, FILM COATED ORAL DAILY
Qty: 30 TAB | Refills: 3 | Status: SHIPPED | OUTPATIENT
Start: 2020-11-30 | End: 2020-12-11

## 2020-11-30 RX ORDER — HYDROXYZINE 25 MG/1
25 TABLET, FILM COATED ORAL
Qty: 42 TAB | Refills: 0 | Status: SHIPPED | OUTPATIENT
Start: 2020-11-30 | End: 2020-12-11 | Stop reason: SDUPTHER

## 2020-11-30 NOTE — TELEPHONE ENCOUNTER
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 Reports that family is around 24 hours a day and is supporting here through this, as well as . Reports 6/8 on SIGECAPS. Denies SI/HI and psycho motor symptoms. H/o of Anxiety and Depression in the past, just weaned off Zoloft because patient had been doing well for a very long time. Will most likely need long term therapy at this point. Restarted Zoloft 50 mg daily w/ Atarax prn for 2 weeks. Clinic will schedule f/u telemed w/in 2 weeks. Patient instructed to reach out to me as needed. CSB hotline number given as well as National Suicide Hotline.

## 2020-12-07 NOTE — TELEPHONE ENCOUNTER
In office appt scheduled with patient. RE: Telemed   Received: 5 days ago   Message Contents   Curtis Garcia MD  [address] [facility]               Acute would be fine as well. Thank you,     Lasha Olson    Previous Messages     ----- Message -----   From: [address]   Sent: 12/1/2020   9:30 AM EST   To: Curtis Garcia MD   Subject: RE: Telemed                                       You have no telemed appt schedule this month.  Only acute and well child.       ----- Message -----   From: Sharonda Keys MD   Sent: 11/30/2020  12:43 PM EST   To: [facility] Front Office   Subject: Telemed                                           Please schedule telemed visit with me as soon as possible.      Thank you,   Lasha Olson

## 2020-12-11 ENCOUNTER — OFFICE VISIT (OUTPATIENT)
Dept: FAMILY MEDICINE CLINIC | Age: 35
End: 2020-12-11

## 2020-12-11 VITALS
RESPIRATION RATE: 16 BRPM | HEART RATE: 81 BPM | BODY MASS INDEX: 23.43 KG/M2 | DIASTOLIC BLOOD PRESSURE: 61 MMHG | WEIGHT: 124 LBS | SYSTOLIC BLOOD PRESSURE: 92 MMHG | TEMPERATURE: 97.8 F | OXYGEN SATURATION: 98 %

## 2020-12-11 DIAGNOSIS — F32.A ANXIETY AND DEPRESSION: ICD-10-CM

## 2020-12-11 DIAGNOSIS — F41.9 ANXIETY AND DEPRESSION: ICD-10-CM

## 2020-12-11 PROCEDURE — 99214 OFFICE O/P EST MOD 30 MIN: CPT | Performed by: STUDENT IN AN ORGANIZED HEALTH CARE EDUCATION/TRAINING PROGRAM

## 2020-12-11 RX ORDER — HYDROXYZINE 25 MG/1
25 TABLET, FILM COATED ORAL
Qty: 42 TAB | Refills: 0 | Status: SHIPPED | OUTPATIENT
Start: 2020-12-11 | End: 2020-12-25

## 2020-12-11 RX ORDER — SERTRALINE HYDROCHLORIDE 100 MG/1
50 TABLET, FILM COATED ORAL DAILY
Qty: 30 TAB | Refills: 0 | Status: SHIPPED | OUTPATIENT
Start: 2020-12-11 | End: 2020-12-28

## 2020-12-11 NOTE — PROGRESS NOTES
Chief Complaint   Patient presents with    Follow-up     Patient presents to follow up about depression; states \"it is a little better than it was during VV. \"     Vitals:    12/11/20 0936   BP: 92/61   BP 1 Location: Left arm   BP Patient Position: Sitting   Pulse: 81   Resp: 16   Temp: 97.8 °F (36.6 °C)   TempSrc: Temporal   SpO2: 98%   Weight: 124 lb (56.2 kg)     1. Have you been to the ER, urgent care clinic since your last visit? Hospitalized since your last visit? No     2. Have you seen or consulted any other health care providers outside of the [address] [place] [address] since your last visit? Include any pap smears or colon screening.  No

## 2020-12-11 NOTE — PROGRESS NOTES
[address] [facility] [address] [place] [address]   Office [phone], Fax [phone]    Subjective:     Chief Complaint   Patient presents with    Follow-up     Patient presents to follow up about depression; states \"it is a little better than it was during VV. \"       HPI:  Shalini Rodriguez is a 37 y.o. female that presents for anxiety and depression. Patient previously was doing very well over the summer and early fall and was able to be tapered off sertraline 100 mg daily. 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 Reports that family is around 24 hours a day and is supporting here through this, as well as . Pt was restarted Zoloft 50 mg daily w/ Atarax prn for 2 weeks. Patient reports improvement in symptoms since restarting 50 mg sertraline but continues to have generalized anxiety and depression. Continues with anhedonia, difficulties w/ concentration and appetite, and psychomotor sympyoms. Denies SI/HI, dry skin, brittle hair, palpitations, difficulty swallowing, voice changes, and weight change. Past Medical Hx  I personally reviewed. Past Medical History:   Diagnosis Date    IBS (irritable bowel syndrome)     Infertility     Infertility, female     Psychiatric problem     depression on meds now, anorexia in past        SocHx   I personally reviewed. Social History     Tobacco Use    Smoking status: Never Smoker    Smokeless tobacco: Never Used   Substance Use Topics    Alcohol use: No    Drug use: No        Allergies  I personally reviewed. No Known Allergies     Medications  I personally reviewed. Current Outpatient Medications on File Prior to Visit   Medication Sig Dispense Refill    ibuprofen (MOTRIN) 800 mg tablet Take 1 Tab by mouth every eight (8) hours as needed for Pain for up to 30 doses. 30 Tab 0    magnesium 250 mg tab Take  by mouth.       acetaminophen (TYLENOL EXTRA STRENGTH) 500 mg tablet Take  by mouth every six (6) hours as needed for Pain.  CALCIUM-MAGNESIUM PO Take  by mouth.  [DISCONTINUED] sertraline (ZOLOFT) 50 mg tablet Take 1 Tab by mouth daily. 30 Tab 3    [DISCONTINUED] hydrOXYzine HCL (ATARAX) 25 mg tablet Take 1 Tab by mouth three (3) times daily as needed for Anxiety for up to 14 days. 42 Tab 0    ferrous sulfate 325 mg (65 mg iron) tablet Take 1 Tab by mouth two (2) times daily (with meals). 60 Tab 5    butalbital-acetaminophen-caffeine (FIORICET, ESGIC) -40 mg per tablet Take 1 Tab by mouth every six (6) hours as needed for Pain. 30 Tab 0    PNV No12-Iron-FA-DSS-OM-3 29 mg iron-1 mg -50 mg CPKD Take  by mouth.  senna-docusate (PERICOLACE) 8.6-50 mg per tablet Take 1 Tab by mouth daily. 30 Tab 1    polyethylene glycol (MIRALAX) 17 gram packet Take 1 Packet by mouth daily. 30 Packet 3     No current facility-administered medications on file prior to visit. ROS:   Gen: No Fevers, Chills,  Fatigue, Night sweats. Head: No Headache, Trauma. ENT: No Blurry vision, Diplopia, Rhinorrhea, Congestion, Dysphagia, Odynophagia. Cardiovascular: No Chest pain, Palpitations. Respiratory: No Dyspnea, Cough. GI: No Abdominal pain, Nausea, Vomiting, Hematochezia/Melena, Change in stools. : No Dysuria, Hematuria. MSK: No Myalgias, Arthralgias. Neuro: No Numbness, Tingling, Seizures, LOC. Psych: Positive for Depression, Anxiety. Objective:   Vitals  I personally reviewed. Visit Vitals  BP 92/61 (BP 1 Location: Left arm, BP Patient Position: Sitting)   Pulse 81   Temp 97.8 °F (36.6 °C) (Temporal)   Resp 16   Wt 124 lb (56.2 kg)   SpO2 98%   BMI 23.43 kg/m²        Physical Exam  Vitals Reviewed. General AO x 3. No acute distress. Not diaphoretic. No jaundice. No cyanosis. No pallor. Neck No thyromegaly present. No cervical adenopathy. Cardio Normal rate, regular rhythm.  No murmur, gallop, or friction rub. No chest wall tenderness. Pulmonary Effort normal. Breath sounds normal. No respiratory distress. No wheezes, rales, or rhonchi. No Stridor. Abdominal Soft. Bowel sounds normal. No distension. No tenderness.  Deferred. Extremities No edema of lower extremities. No tenderness. Distal pulses intact. Neurological CN II-XII grossly intact. No focal deficits. Skin Skin is warm and dry. No rash noted. No erythema. Psychiatric Mood Depressed and anxious. Affect and judgment normal.        I personally reviewed the following Pertinent Labs/Studies:   - Encounters from this year. Lab work 2019. Assessment:       ICD-10-CM ICD-9-CM    1. Anxiety and depression  F41.9 300.00 sertraline (ZOLOFT) 100 mg tablet    F32.9 311 hydrOXYzine HCL (ATARAX) 25 mg tablet       Plan:   1. Anxiety and depression: Chronic attempted and successfully weaned off but recent move triggered relapse. Has been on Zoloft since 25. Will always require basal dosage of Zoloft in future. - CATRINA score 20  - PHQ9 score 20  - Overall improved  - Will increase zoloft 50 to 100 mg daily, and will refill atarax, using 1 tablet daily  - TSH wnl (11/2017)  - sertraline (ZOLOFT) 100 mg tablet; Take 0.5 Tabs by mouth daily. Dispense: 30 Tab; Refill: 0  - hydrOXYzine HCL (ATARAX) 25 mg tablet; Take 1 Tab by mouth three (3) times daily as needed for Anxiety for up to 14 days. Dispense: 42 Tab; Refill: 0  - F/u in 2 weeks      Pt was discussed with Dr Alpa Mckinney (attending physician). I have reviewed patient medical and social history and medications. I have reviewed pertinent labs results and other data. I have discussed the diagnosis with the patient and the intended plan as seen in the above orders. The patient has received an after-visit summary and questions were answered concerning future plans. I have discussed medication side effects and warnings with the patient as well.     Imani Pat MD  Resident [address] [place] [address] Practice  12/11/20

## 2020-12-11 NOTE — PATIENT INSTRUCTIONS
Depression and Chronic Disease: Care Instructions  Your Care Instructions     A chronic disease is one that you have for a long time. Some chronic diseases can be controlled, but they usually cannot be cured. Depression is common in people with chronic diseases, but it often goes unnoticed. Many people have concerns about seeking treatment for a mental health problem. You may think it's a sign of weakness, or you don't want people to know about it. It's important to overcome these reasons for not seeking treatment. Treating depression or anxiety is good for your health. Follow-up care is a key part of your treatment and safety. Be sure to make and go to all appointments, and call your doctor if you are having problems. It's also a good idea to know your test results and keep a list of the medicines you take. How can you care for yourself at home? Watch for symptoms of depression  The symptoms of depression are often subtle at first. You may think they are caused by your disease rather than depression. Or you may think it is normal to be depressed when you have a chronic disease. If you are depressed you may:  · Feel sad or hopeless. · Feel guilty or worthless. · Not enjoy the things you used to enjoy. · Feel hopeless, as though life is not worth living. · Have trouble thinking or remembering. · Have low energy, and you may not eat or sleep well. · Pull away from others. · Think often about death or killing yourself. (Keep the numbers for these national suicide hotlines: 4-884-836-TALK [1-379.213.1936] and 2-135-OUTGUAE [1-567.377.5256]. )  Get treatment  By treating your depression, you can feel more hopeful and have more energy. If you feel better, you may take better care of yourself, so your health may improve. · Talk to your doctor if you have any changes in mood during treatment for your disease. · Ask your doctor for help.  Counseling, antidepressant medicine, or a combination of the two can help most people with depression. Often a combination works best. Counseling can also help you cope with having a chronic disease. When should you call for help? Call 911 anytime you think you may need emergency care. For example, call if:    · You feel like hurting yourself or someone else.     · Someone you know has depression and is about to attempt or is attempting suicide. Call your doctor now or seek immediate medical care if:    · You hear voices.     · Someone you know has depression and:  ? Starts to give away his or her possessions. ? Uses illegal drugs or drinks alcohol heavily. ? Talks or writes about death, including writing suicide notes or talking about guns, knives, or pills. ? Starts to spend a lot of time alone. ? Acts very aggressively or suddenly appears calm. Watch closely for changes in your health, and be sure to contact your doctor if:    · You do not get better as expected. Where can you learn more? Go to http://www.gray.com/  Enter A548 in the search box to learn more about \"Depression and Chronic Disease: Care Instructions. \"  Current as of: January 31, 2020               Content Version: 12.6  © 6426-4702 [employer]. Care instructions adapted under license by [employer] (which disclaims liability or warranty for this information). If you have questions about a medical condition or this instruction, always ask your healthcare professional. [place] [address] any warranty or liability for your use of this information. Anxiety Disorder: Care Instructions  Your Care Instructions     Anxiety is a normal reaction to stress. Difficult situations can cause you to have symptoms such as sweaty palms and a nervous feeling. In an anxiety disorder, the symptoms are far more severe.  Constant worry, muscle tension, trouble sleeping, nausea and diarrhea, and other symptoms can make normal daily activities difficult or impossible. These symptoms may occur for no reason, and they can affect your work, school, or social life. Medicines, counseling, and self-care can all help. Follow-up care is a key part of your treatment and safety. Be sure to make and go to all appointments, and call your doctor if you are having problems. It's also a good idea to know your test results and keep a list of the medicines you take. How can you care for yourself at home? · Take medicines exactly as directed. Call your doctor if you think you are having a problem with your medicine. · Go to your counseling sessions and follow-up appointments. · Recognize and accept your anxiety. Then, when you are in a situation that makes you anxious, say to yourself, \"This is not an emergency. I feel uncomfortable, but I am not in danger. I can keep going even if I feel anxious. \"  · Be kind to your body:  ? Relieve tension with exercise or a massage. ? Get enough rest.  ? Avoid alcohol, caffeine, nicotine, and illegal drugs. They can increase your anxiety level and cause sleep problems. ? Learn and do relaxation techniques. See below for more about these techniques. · Engage your mind. Get out and do something you enjoy. Go to a funny movie, or take a walk or hike. Plan your day. Having too much or too little to do can make you anxious. · Keep a record of your symptoms. Discuss your fears with a good friend or family member, or join a support group for people with similar problems. Talking to others sometimes relieves stress. · Get involved in social groups, or volunteer to help others. Being alone sometimes makes things seem worse than they are. · Get at least 30 minutes of exercise on most days of the week to relieve stress. Walking is a good choice. You also may want to do other activities, such as running, swimming, cycling, or playing tennis or team sports. Relaxation techniques  Do relaxation exercises 10 to 20 minutes a day.  You can play soothing, relaxing music while you do them, if you wish. · Tell others in your house that you are going to do your relaxation exercises. Ask them not to disturb you. · Find a comfortable place, away from all distractions and noise. · Lie down on your back, or sit with your back straight. · Focus on your breathing. Make it slow and steady. · Breathe in through your nose. Breathe out through either your nose or mouth. · Breathe deeply, filling up the area between your navel and your rib cage. Breathe so that your belly goes up and down. · Do not hold your breath. · Breathe like this for 5 to 10 minutes. Notice the feeling of calmness throughout your whole body. As you continue to breathe slowly and deeply, relax by doing the following for another 5 to 10 minutes:  · Tighten and relax each muscle group in your body. You can begin at your toes and work your way up to your head. · Imagine your muscle groups relaxing and becoming heavy. · Empty your mind of all thoughts. · Let yourself relax more and more deeply. · Become aware of the state of calmness that surrounds you. · When your relaxation time is over, you can bring yourself back to alertness by moving your fingers and toes and then your hands and feet and then stretching and moving your entire body. Sometimes people fall asleep during relaxation, but they usually wake up shortly afterward. · Always give yourself time to return to full alertness before you drive a car or do anything that might cause an accident if you are not fully alert. Never play a relaxation tape while you drive a car. When should you call for help? Call 911 anytime you think you may need emergency care. For example, call if:    · You feel you cannot stop from hurting yourself or someone else. Keep the numbers for these national suicide hotlines: 1-753-328-TALK (4-209.955.1661) and 4-099-JRXLDFD (7-140.676.5778).  If you or someone you know talks about suicide or feeling hopeless, get help right away. Watch closely for changes in your health, and be sure to contact your doctor if:    · You have anxiety or fear that affects your life.     · You have symptoms of anxiety that are new or different from those you had before. Where can you learn more? Go to http://www.barber.com/  Enter P754 in the search box to learn more about \"Anxiety Disorder: Care Instructions. \"  Current as of: January 31, 2020               Content Version: 12.6  © 9741-9930 "[employer]. Care instructions adapted under license by [employer] (which disclaims liability or warranty for this information). If you have questions about a medical condition or this instruction, always ask your healthcare professional. [address] any warranty or liability for your use of this information.

## 2020-12-28 ENCOUNTER — VIRTUAL VISIT (OUTPATIENT)
Dept: FAMILY MEDICINE CLINIC | Age: 35
End: 2020-12-28

## 2020-12-28 DIAGNOSIS — R53.83 FATIGUE, UNSPECIFIED TYPE: ICD-10-CM

## 2020-12-28 DIAGNOSIS — F32.A ANXIETY AND DEPRESSION: Primary | ICD-10-CM

## 2020-12-28 DIAGNOSIS — F41.9 ANXIETY AND DEPRESSION: Primary | ICD-10-CM

## 2020-12-28 PROCEDURE — 99214 OFFICE O/P EST MOD 30 MIN: CPT | Performed by: STUDENT IN AN ORGANIZED HEALTH CARE EDUCATION/TRAINING PROGRAM

## 2020-12-28 RX ORDER — SERTRALINE HYDROCHLORIDE 100 MG/1
150 TABLET, FILM COATED ORAL DAILY
Qty: 45 TAB | Refills: 0 | Status: SHIPPED | OUTPATIENT
Start: 2020-12-28 | End: 2021-02-04

## 2020-12-28 NOTE — PROGRESS NOTES
Shaneka Trinidad  37 y.o. female  1985  [address] [place] [address],B-1  [id number]   [address]:    Telemedicine Progress Note  Sole Lambert MD       Encounter Date and Time: December 28, 2020 at 10:20 AM    Consent:  She and/or the health care decision maker is aware that that she may receive a bill for this telephone service, depending on her insurance coverage, and has provided verbal consent to proceed: Yes    Chief Complaint   Patient presents with    Anxiety    Depression     History of Present Illness   Shaneka Trinidad is a 37 y.o. female was evaluated by synchronous (real-time) audio-video technology from home, through the [facility] Patient Portal.    Anxiety and Depression:  - Reports improvement, mostly with anxiety, some improvement with depression however pt recently had a death in the family, Uncle, despite that she has been handling the death well. Recent increase of Zoloft 50 to 100 mg back to her previous baseline. Continues to report good family support. Has not seen a Psychologist yet. Also reports that she has not needed atarax at all in several weeks. Continues with anhedonia, difficulties w/ concentration and appetite, sleep, and psychomotor sympyoms. Denies SI/HI, dry skin, brittle hair, palpitations, difficulty swallowing, voice changes, and weight change. Review of Systems   Review of Systems   Constitutional: Negative for chills and fever. HENT: Negative for congestion, sinus pain and sore throat. Eyes: Negative for pain. Respiratory: Negative for cough and shortness of breath. Cardiovascular: Negative for chest pain and palpitations. Gastrointestinal: Negative for abdominal pain, constipation, diarrhea, nausea and vomiting. Genitourinary: Negative for dysuria. Musculoskeletal: Negative for myalgias. Skin: Negative for rash. Neurological: Negative for dizziness and headaches.    Endo/Heme/Allergies: Negative for polydipsia. Psychiatric/Behavioral: Positive for depression. The patient is nervous/anxious. Vitals/Objective:     General: alert, cooperative, no distress   Mental  status: mental status: alert, oriented to person, place, and time, normal mood, behavior, speech, dress, motor activity, and thought processes   Resp: resp: normal effort and no respiratory distress   Neuro: neuro: no gross deficits   Skin: skin: no discoloration or lesions of concern on visible areas   Due to this being a TeleHealth evaluation, many elements of the physical examination are unable to be assessed. Assessment and Plan:   Time-based coding, delete if not needed: I spent at least 25 minutes with this established patient, and >50% of the time was spent counseling and/or coordinating care regarding Anxiety and Depression    1. Anxiety and depression: Improved, Recent death in the family 1 week ago and dealing with it well. Depression objectively based on PHQ9 has remained stable. Pt would like to try increasing dose of Zoloft. Denies SE's. Tolerating well. - GAD7 score: 8 (down from 20)  - PHQ9 score: 20 (no improvement from 20 previously, however death in family past 1-2 weeks)  - Will increases Zoloft 100 mg daily to 150 mg daily  - Will check for SIADH/Na w/ BMP and TSH  - Last TSH in 2017 was wnl  - sertraline (ZOLOFT) 100 mg tablet; Take 1.5 Tabs by mouth daily. Indications: anxiousness associated with depression  Dispense: 45 Tab; Refill: 0  - TSH 3RD GENERATION; Future  - METABOLIC PANEL, BASIC; Future    2. Fatigue, unspecified type: Chronic, likely 2/2 to the above. Per chart review mild anemia in past. Will recheck cbc and TSH.  - TSH 3RD GENERATION; Future  - METABOLIC PANEL, BASIC; Future  - CBC W/O DIFF; Future      We discussed the expected course, resolution and complications of the diagnosis(es) in detail. Medication risks, benefits, costs, interactions, and alternatives were discussed as indicated.   I advised her to contact the office if her condition worsens, changes or fails to improve as anticipated. She expressed understanding with the diagnosis(es) and plan. Patient understands that this encounter was a temporary measure, and the importance of further follow up and examination was emphasized. Patient verbalized understanding. Patient informed to follow up: Follow-up and Dispositions  ·   Return in about 3 weeks (around 2021) for Anxiety and Depression. Electronically Signed: Mario Jackson MD    Providers location when delivering service: HOME      Pursuant to the emergency declaration under the [facility] waiver authority and the [employer] Act, this Virtual  Visit was conducted, with patient's consent, to reduce the patient's risk of exposure to COVID-19 and provide continuity of care for an established patient. Services were provided through a video synchronous discussion virtually to substitute for in-person clinic visit. History   Patients past medical, surgical and family histories were reviewed and updated.       Past Medical History:   Diagnosis Date    IBS (irritable bowel syndrome)     Infertility     Infertility, female     Psychiatric problem     depression on meds now, anorexia in past     Past Surgical History:   Procedure Laterality Date     DELIVERY ONLY       for macrosomia(9-13)    HX HEMORRHOIDECTOMY       Family History   Problem Relation Age of Onset    Asthma Brother     Hypertension Maternal Grandfather      Social History     Socioeconomic History    Marital status:      Spouse name: Not on file    Number of children: Not on file    Years of education: Not on file    Highest education level: Not on file   Occupational History    Not on file   Social Needs    Financial resource strain: Not on file    Food insecurity     Worry: Not on file Inability: Not on file    Transportation needs     Medical: Not on file     Non-medical: Not on file   Tobacco Use    Smoking status: Never Smoker    Smokeless tobacco: Never Used   Substance and Sexual Activity    Alcohol use: No    Drug use: No    Sexual activity: Yes     Partners: Male     Birth control/protection: None   Lifestyle    Physical activity     Days per week: Not on file     Minutes per session: Not on file    Stress: Not on file   Relationships    Social connections     Talks on phone: Not on file     Gets together: Not on file     Attends [employer] service: Not on file     Active member of club or organization: Not on file     Attends meetings of clubs or organizations: Not on file     Relationship status: Not on file    Intimate partner violence     Fear of current or ex partner: Not on file     Emotionally abused: Not on file     Physically abused: Not on file     Forced sexual activity: Not on file   Other Topics Concern     Service Not Asked    Blood Transfusions Not Asked    Caffeine Concern Not Asked    Occupational Exposure Not Asked   Verle Haggis Hazards Not Asked    Sleep Concern Not Asked    Stress Concern Not Asked    Weight Concern Not Asked    Special Diet Not Asked    Back Care Not Asked    Exercise Not Asked    Bike Helmet Not Asked   [address] Not Asked    Self-Exams Not Asked   Social History Narrative    Not on file     Patient Active Problem List   Diagnosis Code    Anorexia R63.0    Depression with anxiety F41.8    Urinary urgency R39.15    Polydipsia R63.1    Varicose veins of both lower extremities I83.93    Irritable bowel syndrome with constipation K58.1          Current Medications/Allergies   Medications and Allergies reviewed:    Current Outpatient Medications   Medication Sig Dispense Refill    sertraline (ZOLOFT) 100 mg tablet Take 1.5 Tabs by mouth daily.  Indications: anxiousness associated with depression 45 Tab 0    ibuprofen (MOTRIN) 800 mg tablet Take 1 Tab by mouth every eight (8) hours as needed for Pain for up to 30 doses. 30 Tab 0    ferrous sulfate 325 mg (65 mg iron) tablet Take 1 Tab by mouth two (2) times daily (with meals). 60 Tab 5    magnesium 250 mg tab Take  by mouth.  acetaminophen (TYLENOL EXTRA STRENGTH) 500 mg tablet Take  by mouth every six (6) hours as needed for Pain.  butalbital-acetaminophen-caffeine (FIORICET, ESGIC) -40 mg per tablet Take 1 Tab by mouth every six (6) hours as needed for Pain. 30 Tab 0    CALCIUM-MAGNESIUM PO Take  by mouth.  PNV No12-Iron-FA-DSS-OM-3 29 mg iron-1 mg -50 mg CPKD Take  by mouth.  senna-docusate (PERICOLACE) 8.6-50 mg per tablet Take 1 Tab by mouth daily. 30 Tab 1    polyethylene glycol (MIRALAX) 17 gram packet Take 1 Packet by mouth daily.  30 Packet 3     No Known Allergies

## 2020-12-29 NOTE — PROGRESS NOTES
[address] Medicine Residency Attending Addendum:  Dr. Dawn York MD,  the patient and I were not physically present during this encounter. The resident and I are concurrently monitoring the patient care through appropriate telecommunication technology. I discussed the findings, assessment and plan with the resident and agree with the resident's findings and plan as documented in the resident's note.       Lisa Metzger MD

## 2021-01-12 LAB
BUN SERPL-MCNC: 12 MG/DL (ref 6–20)
BUN/CREAT SERPL: 16 (ref 9–23)
CALCIUM SERPL-MCNC: 9.8 MG/DL (ref 8.7–10.2)
CHLORIDE SERPL-SCNC: 103 MMOL/L (ref 96–106)
CO2 SERPL-SCNC: 22 MMOL/L (ref 20–29)
CREAT SERPL-MCNC: 0.74 MG/DL (ref 0.57–1)
ERYTHROCYTE [DISTWIDTH] IN BLOOD BY AUTOMATED COUNT: 11.8 % (ref 11.7–15.4)
GLUCOSE SERPL-MCNC: 90 MG/DL (ref 65–99)
HCT VFR BLD AUTO: 42.4 % (ref 34–46.6)
HGB BLD-MCNC: 14.1 G/DL (ref 11.1–15.9)
MCH RBC QN AUTO: 29.7 PG (ref 26.6–33)
MCHC RBC AUTO-ENTMCNC: 33.3 G/DL (ref 31.5–35.7)
MCV RBC AUTO: 89 FL (ref 79–97)
PLATELET # BLD AUTO: 283 X10E3/UL (ref 150–450)
POTASSIUM SERPL-SCNC: 4.3 MMOL/L (ref 3.5–5.2)
RBC # BLD AUTO: 4.75 X10E6/UL (ref 3.77–5.28)
SODIUM SERPL-SCNC: 140 MMOL/L (ref 134–144)
TSH SERPL DL<=0.005 MIU/L-ACNC: 1.64 UIU/ML (ref 0.45–4.5)
WBC # BLD AUTO: 4.9 X10E3/UL (ref 3.4–10.8)

## 2021-05-28 DIAGNOSIS — O21.9 NAUSEA/VOMITING IN PREGNANCY: Primary | ICD-10-CM

## 2021-05-28 RX ORDER — PROMETHAZINE HYDROCHLORIDE 25 MG/1
TABLET ORAL
Qty: 30 TABLET | Refills: 3 | Status: SHIPPED | OUTPATIENT
Start: 2021-05-28 | End: 2021-07-14

## 2021-05-28 RX ORDER — ONDANSETRON 4 MG/1
4 TABLET, ORALLY DISINTEGRATING ORAL
Qty: 30 TABLET | Refills: 3 | Status: SHIPPED | OUTPATIENT
Start: 2021-05-28 | End: 2021-07-14

## 2021-05-28 NOTE — PROGRESS NOTES
Pt newly pregnant, hx of severe n/v in pregnancy, very sick again. Sent [facility] message that she doesn't think she can come in because she's so nauseated (lives over an hour away) and asking for Rx for nausea. Sent in Zofran and Phenergan - advised she can try one at a time.

## 2021-07-14 ENCOUNTER — INITIAL PRENATAL (OUTPATIENT)
Dept: FAMILY MEDICINE CLINIC | Age: 36
End: 2021-07-14

## 2021-07-14 VITALS
RESPIRATION RATE: 16 BRPM | DIASTOLIC BLOOD PRESSURE: 69 MMHG | HEART RATE: 69 BPM | SYSTOLIC BLOOD PRESSURE: 109 MMHG | OXYGEN SATURATION: 98 % | BODY MASS INDEX: 26.07 KG/M2 | WEIGHT: 138 LBS

## 2021-07-14 DIAGNOSIS — Z34.90 ENCOUNTER FOR SUPERVISION OF NORMAL PREGNANCY, ANTEPARTUM, UNSPECIFIED GRAVIDITY: Primary | ICD-10-CM

## 2021-07-14 DIAGNOSIS — Z98.891 HISTORY OF CESAREAN DELIVERY: ICD-10-CM

## 2021-07-14 DIAGNOSIS — F32.A ANXIETY AND DEPRESSION: ICD-10-CM

## 2021-07-14 DIAGNOSIS — F41.9 ANXIETY AND DEPRESSION: ICD-10-CM

## 2021-07-14 DIAGNOSIS — R53.83 FATIGUE, UNSPECIFIED TYPE: ICD-10-CM

## 2021-07-14 DIAGNOSIS — Z98.891 HISTORY OF VBAC: ICD-10-CM

## 2021-07-14 DIAGNOSIS — Z34.90 ENCOUNTER FOR SUPERVISION OF NORMAL PREGNANCY, ANTEPARTUM, UNSPECIFIED GRAVIDITY: ICD-10-CM

## 2021-07-14 LAB
ANTIBODY SCREEN, EXTERNAL: NEGATIVE
BILIRUB UR QL STRIP: NEGATIVE
BLOOD BANK CMNT PATIENT-IMP: NORMAL
BLOOD GROUP ANTIBODIES SERPL: NORMAL
CHLAMYDIA, EXTERNAL: NEGATIVE
GLUCOSE UR-MCNC: NEGATIVE MG/DL
HBSAG, EXTERNAL: NEGATIVE
HIV, EXTERNAL: NORMAL
KETONES P FAST UR STRIP-MCNC: NEGATIVE MG/DL
N. GONORRHEA, EXTERNAL: NEGATIVE
PH UR STRIP: 7 [PH] (ref 4.6–8)
PROT UR QL STRIP: NEGATIVE
RPR, EXTERNAL: NORMAL
RUBELLA, EXTERNAL: NORMAL
SP GR UR STRIP: 1.02 (ref 1–1.03)
UA UROBILINOGEN AMB POC: NORMAL (ref 0.2–1)
URINALYSIS CLARITY POC: CLEAR
URINALYSIS COLOR POC: YELLOW
URINE BLOOD POC: NEGATIVE
URINE LEUKOCYTES POC: NEGATIVE
URINE NITRITES POC: NEGATIVE

## 2021-07-14 PROCEDURE — 0500F INITIAL PRENATAL CARE VISIT: CPT | Performed by: FAMILY MEDICINE

## 2021-07-14 PROCEDURE — 81003 URINALYSIS AUTO W/O SCOPE: CPT | Performed by: FAMILY MEDICINE

## 2021-07-14 NOTE — PROGRESS NOTES
Chief Complaint   Patient presents with    Initial Prenatal Visit       Patient identified with 2 patient identifiers (name and D. O. B)    Patient is a  at 14w6d    Leakage of Fluid: NO  Vaginal Bleeding: NO  Fetal Movement: no  Prenatal vitamins: Yes  Having Contractions: NO  Pain: NO    Visit Vitals  Resp 16   Wt 138 lb (62.6 kg)   LMP 2021   BMI 26.07 kg/m²       Immunization History   Administered Date(s) Administered    Influenza Vaccine Split 10/11/2012    Tdap 2015, 2019

## 2021-07-14 NOTE — PROGRESS NOTES
History and Physical    Patient: Sabrina Crane MRN: [id number]  SSN: [id number]    YOB: 1985  Age: 37 y.o. Sex: female      Subjective:      Sabrina Crane is a 37 y.o. female  at 14w6d who presents for [address] visit. Planned pregnancy   Patient's last menstrual period was 2021. is certain of her LMP   Mood stable on Zoloft 150mg daily, wants to wean down eventually       Past Medical History:   Diagnosis Date    IBS (irritable bowel syndrome)     Infertility     Infertility, female     Psychiatric problem     depression on meds now, anorexia in past     Past Surgical History:   Procedure Laterality Date    HX HEMORRHOIDECTOMY      [place]  DELIVERY ONLY       for macrosomia(9-13)      Family History   Problem Relation Age of Onset    Asthma Brother     Hypertension Maternal Grandfather      Social History     Tobacco Use    Smoking status: Never Smoker    Smokeless tobacco: Never Used   Substance Use Topics    Alcohol use: No      Prior to Admission medications    Medication Sig Start Date End Date Taking? Authorizing Provider   sertraline (ZOLOFT) 100 mg tablet TAKE 1 1/2 TABLET BY MOUTH DAILY FOR ANXIOUSNESS ASSOCIATED WITH DEPRESSION 21   Jihan Garcia MD   ferrous sulfate 325 mg (65 mg iron) tablet Take 1 Tab by mouth two (2) times daily (with meals). 19   Paige Schuler MD   magnesium 250 mg tab Take  by mouth. Provider, Historical   acetaminophen (TYLENOL EXTRA STRENGTH) 500 mg tablet Take  by mouth every six (6) hours as needed for Pain. Provider, Historical   CALCIUM-MAGNESIUM PO Take  by mouth. Provider, Historical   PNV No12-Iron-FA-DSS-OM-3 29 mg iron-1 mg -50 mg CPKD Take  by mouth. Provider, Historical        No Known Allergies    Review of Systems:  ROS negative except as noted in HPI.     Objective:     Vitals:    21 1209   BP: 109/69   Pulse: 69   Resp: 16   SpO2: 98%   Weight: 62.6 kg (138 lb) Physical Exam:  See prenatal physical exam.    Assessment/Plan:   36yo  @ 14w6d by LMP c/w 14 wk scan  1. IUP: IOB labs, declines genetic testing  2. Hx CS with  x3: counseled on risks of uterine rupture <1%, accepts risk and desires to proceed  3. Depression: stable on Zoloft 150mg daily   4.   N/V: starting to improve, no weight loss    Signed By: Oksana Handley DO     2021

## 2021-07-15 LAB
ERYTHROCYTE [DISTWIDTH] IN BLOOD BY AUTOMATED COUNT: 12.5 % (ref 11.5–14.5)
HBV SURFACE AG SER QL: <0.1 INDEX
HBV SURFACE AG SER QL: NEGATIVE
HCT VFR BLD AUTO: 37.5 % (ref 35–47)
HGB BLD-MCNC: 12.2 G/DL (ref 11.5–16)
HIV 1+2 AB+HIV1 P24 AG SERPL QL IA: NONREACTIVE
HIV12 RESULT COMMENT, HHIVC: NORMAL
MCH RBC QN AUTO: 31.1 PG (ref 26–34)
MCHC RBC AUTO-ENTMCNC: 32.5 G/DL (ref 30–36.5)
MCV RBC AUTO: 95.7 FL (ref 80–99)
NRBC # BLD: 0 K/UL (ref 0–0.01)
NRBC BLD-RTO: 0 PER 100 WBC
PLATELET # BLD AUTO: 278 K/UL (ref 150–400)
PMV BLD AUTO: 10.7 FL (ref 8.9–12.9)
RBC # BLD AUTO: 3.92 M/UL (ref 3.8–5.2)
RPR SER QL: NONREACTIVE
RUBV IGG SER-IMP: REACTIVE
RUBV IGG SERPL IA-ACNC: 375.1 IU/ML
WBC # BLD AUTO: 8.1 K/UL (ref 3.6–11)

## 2021-07-16 LAB
BACTERIA SPEC CULT: NORMAL
SERVICE CMNT-IMP: NORMAL
VZV IGG SER IA-ACNC: 167 INDEX

## 2021-07-19 LAB
C TRACH RRNA SPEC QL NAA+PROBE: NEGATIVE
N GONORRHOEA RRNA SPEC QL NAA+PROBE: NEGATIVE
PLEASE NOTE:, 188601: NORMAL
SPECIMEN SOURCE: NORMAL

## 2021-08-25 ENCOUNTER — HOSPITAL ENCOUNTER (OUTPATIENT)
Dept: PERINATAL CARE | Age: 36
Discharge: HOME | End: 2021-08-25
Attending: OBSTETRICS & GYNECOLOGY

## 2021-08-25 PROCEDURE — 76805 OB US >/= 14 WKS SNGL FETUS: CPT | Performed by: OBSTETRICS & GYNECOLOGY

## 2021-08-30 ENCOUNTER — TELEPHONE (OUTPATIENT)
Dept: FAMILY MEDICINE CLINIC | Age: 36
End: 2021-08-30

## 2021-08-30 NOTE — TELEPHONE ENCOUNTER
Pt called to cancel appt of 9/10/21 as she will be out of town. She states she thought she mentioned this previously when she had spoke with Dr. Rema Wick. She states she was under the impression appt would be rescheduled for her. Pt need to reschedule.     Zayda Gomez (Self) [phone] (

## 2021-09-15 ENCOUNTER — ROUTINE PRENATAL (OUTPATIENT)
Dept: FAMILY MEDICINE CLINIC | Age: 36
End: 2021-09-15

## 2021-09-15 VITALS — RESPIRATION RATE: 16 BRPM | WEIGHT: 155 LBS | BODY MASS INDEX: 29.29 KG/M2

## 2021-09-15 DIAGNOSIS — I83.93 VARICOSE VEINS OF BOTH LOWER EXTREMITIES, UNSPECIFIED WHETHER COMPLICATED: ICD-10-CM

## 2021-09-15 DIAGNOSIS — Z98.891 HISTORY OF VBAC: ICD-10-CM

## 2021-09-15 DIAGNOSIS — F41.8 DEPRESSION WITH ANXIETY: ICD-10-CM

## 2021-09-15 DIAGNOSIS — Z34.90 ENCOUNTER FOR SUPERVISION OF NORMAL PREGNANCY, ANTEPARTUM, UNSPECIFIED GRAVIDITY: Primary | ICD-10-CM

## 2021-09-15 PROCEDURE — 0502F SUBSEQUENT PRENATAL CARE: CPT | Performed by: FAMILY MEDICINE

## 2021-09-15 NOTE — PROGRESS NOTES
Chief Complaint   Patient presents with    Routine Prenatal Visit       Patient identified with 2 patient identifiers (name and D. O. B)    Patient is a  at 23w6d    Leakage of Fluid: NO  Vaginal Bleeding: NO  Fetal Movement: YES  Prenatal vitamins: YES  Having Contractions: NO  Pain: NO    Visit Vitals  Wt 155 lb (70.3 kg)   LMP 2021   BMI 29.29 kg/m²       Immunization History   Administered Date(s) Administered    Influenza Vaccine Split 10/11/2012    Tdap 2015, 2019

## 2021-09-15 NOTE — PROGRESS NOTES
I reviewed with the resident the medical history and the resident's findings on the physical examination. I discussed with the resident the patient's diagnosis and concur with the plan. 36yo  @ 23w6d by LMP c/w 14 wk scan  1. IUP: IOB labs, declines genetic testing  2. Hx CS with  x3: counseled on risks of uterine rupture <1%, accepts risk and desires to proceed  3.   Depression: stable on Zoloft 150mg daily

## 2021-09-24 DIAGNOSIS — F41.9 ANXIETY AND DEPRESSION: ICD-10-CM

## 2021-09-24 DIAGNOSIS — F32.A ANXIETY AND DEPRESSION: ICD-10-CM

## 2021-09-28 RX ORDER — SERTRALINE HYDROCHLORIDE 100 MG/1
100 TABLET, FILM COATED ORAL DAILY
Qty: 60 TABLET | Refills: 3 | Status: SHIPPED | OUTPATIENT
Start: 2021-09-28 | End: 2022-04-05

## 2021-09-29 NOTE — PROGRESS NOTES
Estimated Date of Delivery: 1/6/22  Area below placenta cord with large lacunar space uncertain significance  Consider follow up growth scan 26-28 weeks

## 2021-10-13 ENCOUNTER — ROUTINE PRENATAL (OUTPATIENT)
Dept: FAMILY MEDICINE CLINIC | Age: 36
End: 2021-10-13

## 2021-10-13 VITALS
WEIGHT: 158 LBS | SYSTOLIC BLOOD PRESSURE: 97 MMHG | OXYGEN SATURATION: 99 % | DIASTOLIC BLOOD PRESSURE: 61 MMHG | HEART RATE: 76 BPM | BODY MASS INDEX: 29.85 KG/M2

## 2021-10-13 DIAGNOSIS — Z98.891 HISTORY OF VBAC: ICD-10-CM

## 2021-10-13 DIAGNOSIS — Z34.90 ENCOUNTER FOR SUPERVISION OF NORMAL PREGNANCY, ANTEPARTUM, UNSPECIFIED GRAVIDITY: Primary | ICD-10-CM

## 2021-10-13 DIAGNOSIS — F41.8 DEPRESSION WITH ANXIETY: ICD-10-CM

## 2021-10-13 LAB
ERYTHROCYTE [DISTWIDTH] IN BLOOD BY AUTOMATED COUNT: 12.3 % (ref 11.5–14.5)
GLUCOSE 1H P 100 G GLC PO SERPL-MCNC: 125 MG/DL (ref 65–140)
HCT VFR BLD AUTO: 34.2 % (ref 35–47)
HGB BLD-MCNC: 10.9 G/DL (ref 11.5–16)
MCH RBC QN AUTO: 31.2 PG (ref 26–34)
MCHC RBC AUTO-ENTMCNC: 31.9 G/DL (ref 30–36.5)
MCV RBC AUTO: 98 FL (ref 80–99)
NRBC # BLD: 0 K/UL (ref 0–0.01)
NRBC BLD-RTO: 0 PER 100 WBC
PLATELET # BLD AUTO: 252 K/UL (ref 150–400)
PMV BLD AUTO: 10.4 FL (ref 8.9–12.9)
RBC # BLD AUTO: 3.49 M/UL (ref 3.8–5.2)
WBC # BLD AUTO: 8.2 K/UL (ref 3.6–11)

## 2021-10-13 PROCEDURE — 0502F SUBSEQUENT PRENATAL CARE: CPT | Performed by: FAMILY MEDICINE

## 2021-10-13 NOTE — PROGRESS NOTES
Baby moving well    36yo  @ 27w6d by LMP c/w 14 wk scan  1. IUP: RH pos, declines genetic testing  2. Hx CS with  x3: counseled on risks of uterine rupture <1%, accepts risk and desires to proceed  3. Depression: stable on Zoloft 150mg daily   4. Lacunar space under placenta: consider growth scan per [facility], discussed with patient and she declines f/up scan unless concerns with FH measurement which has always been fine  5. Orthostasis sx: has been checking her BP at home and on the lower side in 80's syst.  Recently when she got up from sitting and quickly walked upstairs she had a presyncopal feeling. Notices with changing positions suddenly. Discussed drinking more fluids, adding salt to diet, compression stockings and changing positions slowly. If sx continue despite the former, will do a cardiac work up.       Estimated Date of Delivery: 22

## 2021-10-27 ENCOUNTER — ROUTINE PRENATAL (OUTPATIENT)
Dept: FAMILY MEDICINE CLINIC | Age: 36
End: 2021-10-27

## 2021-10-27 DIAGNOSIS — Z98.891 HISTORY OF VBAC: ICD-10-CM

## 2021-10-27 DIAGNOSIS — Z34.90 ENCOUNTER FOR SUPERVISION OF NORMAL PREGNANCY, ANTEPARTUM, UNSPECIFIED GRAVIDITY: Primary | ICD-10-CM

## 2021-10-27 PROCEDURE — 0502F SUBSEQUENT PRENATAL CARE: CPT | Performed by: FAMILY MEDICINE

## 2021-10-27 NOTE — PROGRESS NOTES
Baby moving well  No more dizzy episodes, is drinking more water now   Painful varicose veins again this pregnancy, now in vaginal area     34yo  @ 29w6d by LMP c/w 14 wk scan  1. IUP: RH pos, declines genetic testing, GTT+CBC ok   2. Hx CS with  x3: counseled on risks of uterine rupture <1%, accepts risk and desires to proceed  3. Depression: stable on Zoloft 150mg daily   4. Lacunar space under placenta: consider growth scan per [facility], discussed with patient and she declines f/up scan unless concerns with FH measurement which has always been fine  5.   Orthostasis sx: improving with hydration     Estimated Date of Delivery: 22

## 2021-11-15 ENCOUNTER — HOSPITAL ENCOUNTER (OUTPATIENT)
Dept: VASCULAR SURGERY | Age: 36
Discharge: HOME | End: 2021-11-15
Attending: FAMILY MEDICINE

## 2021-11-15 ENCOUNTER — ROUTINE PRENATAL (OUTPATIENT)
Dept: FAMILY MEDICINE CLINIC | Age: 36
End: 2021-11-15

## 2021-11-15 DIAGNOSIS — M79.89 LEG SWELLING: ICD-10-CM

## 2021-11-15 DIAGNOSIS — I83.93 VARICOSE VEINS OF BOTH LOWER EXTREMITIES, UNSPECIFIED WHETHER COMPLICATED: ICD-10-CM

## 2021-11-15 DIAGNOSIS — Z98.891 HISTORY OF VBAC: ICD-10-CM

## 2021-11-15 DIAGNOSIS — Z34.90 ENCOUNTER FOR SUPERVISION OF NORMAL PREGNANCY, ANTEPARTUM, UNSPECIFIED GRAVIDITY: Primary | ICD-10-CM

## 2021-11-15 PROCEDURE — 93971 EXTREMITY STUDY: CPT

## 2021-11-15 PROCEDURE — 0502F SUBSEQUENT PRENATAL CARE: CPT | Performed by: FAMILY MEDICINE

## 2021-11-15 NOTE — PROGRESS NOTES
Baby moving well  Varicose veins more prominent now, RLE now with painful calf, no significant swelling swelling compared to the other side    On exam, very prominent varicose veins that are tender to touch, RLE. Also with tenderness of calf in areas that there are not veins. No significant warmth or redness. 34yo  @ 32w4d by LMP c/w 14 wk scan  1. IUP: RH pos, declines genetic testing, GTT+CBC ok   2. Hx CS with  x3: counseled on risks of uterine rupture <1%, accepts risk and desires to proceed  3. Depression: stable on Zoloft 150mg daily   4. Lacunar space under placenta: consider growth scan per [facility], discussed with patient and she declines f/up scan unless concerns with FH measurement which has always been fine  5. Orthostasis sx: improving with hydration   6.   RLE calf tenderness: suspect all from superficial thrombophlebitis but will r/o DVT given calf tenderness    Estimated Date of Delivery: 22

## 2021-12-06 ENCOUNTER — ROUTINE PRENATAL (OUTPATIENT)
Dept: FAMILY MEDICINE CLINIC | Age: 36
End: 2021-12-06

## 2021-12-06 VITALS — WEIGHT: 161 LBS | OXYGEN SATURATION: 98 % | BODY MASS INDEX: 30.42 KG/M2 | RESPIRATION RATE: 16 BRPM

## 2021-12-06 DIAGNOSIS — Z34.90 ENCOUNTER FOR SUPERVISION OF NORMAL PREGNANCY, ANTEPARTUM, UNSPECIFIED GRAVIDITY: Primary | ICD-10-CM

## 2021-12-06 DIAGNOSIS — Z98.891 HISTORY OF VBAC: ICD-10-CM

## 2021-12-06 DIAGNOSIS — I83.93 VARICOSE VEINS OF BOTH LOWER EXTREMITIES, UNSPECIFIED WHETHER COMPLICATED: ICD-10-CM

## 2021-12-06 PROCEDURE — 0502F SUBSEQUENT PRENATAL CARE: CPT | Performed by: FAMILY MEDICINE

## 2021-12-06 NOTE — PROGRESS NOTES
Baby moving well  Gets sharp pelvic pain randomly  Prefers next appt virtual     SVE: /thick/-3    36yo  @ 35w4d by LMP c/w 14 wk scan  1. IUP: RH pos, declines genetic testing, GTT+CBC ok, GBS next visit (in case she presents prior to GBS collection, she would get treated as prior pregnancies GBS+)  2. Hx CS with  x3: counseled on risks of uterine rupture <1%, accepts risk and desires to proceed  3. Depression: stable on Zoloft 150mg daily   4. Lacunar space under placenta: consider growth scan per [facility], discussed with patient and she declines f/up scan unless concerns with FH measurement which has always been fine  5. Orthostasis sx: improving with hydration   6.   RLE calf tenderness: doppler was neg for DVT     Estimated Date of Delivery: 22

## 2021-12-10 ENCOUNTER — TELEPHONE (OUTPATIENT)
Dept: FAMILY MEDICINE CLINIC | Age: 36
End: 2021-12-10

## 2021-12-10 NOTE — TELEPHONE ENCOUNTER
Called patient for triage. Patient is a  at 36w1d. She is having vomiting and diarrhea that started yesterday night. Her children are also having the same symptoms. She denies any vaginal bleeding or leakage of fluid. She is still feeling the baby move, and having montrell-delacruz contractions. She checked her blood pressure while on the phone with me and the reading was 98/66. Informed patient of safe tylenol dosage in pregnancy and that she needs to drink extra fluids. Advised her if her symptoms worsen or continue to go to ER.

## 2021-12-10 NOTE — TELEPHONE ENCOUNTER
[phone]    Patient called to say is 36 weeks pregnant. Said she is very sick, throwing up and diarrhea. Also her children are sick with these symptoms. Please call for advice.

## 2021-12-15 ENCOUNTER — ROUTINE PRENATAL (OUTPATIENT)
Dept: FAMILY MEDICINE CLINIC | Age: 36
End: 2021-12-15

## 2021-12-15 DIAGNOSIS — Z98.891 HISTORY OF VBAC: ICD-10-CM

## 2021-12-15 DIAGNOSIS — Z34.90 ENCOUNTER FOR SUPERVISION OF NORMAL PREGNANCY, ANTEPARTUM, UNSPECIFIED GRAVIDITY: Primary | ICD-10-CM

## 2021-12-15 PROCEDURE — 0502F SUBSEQUENT PRENATAL CARE: CPT | Performed by: FAMILY MEDICINE

## 2021-12-15 NOTE — PROGRESS NOTES
/74, pt worried that's high for her, no HA or vision changes  Pelvic pain at night  Not sleeping well, taking Unisom   Baby moving, had a few strong ctx last night that went away    34yo  @ 36w6d by LMP c/w 14 wk scan  1. IUP: RH pos, declines genetic testing, GTT+CBC ok, GBS next visit (in case she presents prior to GBS collection, she would get treated as prior pregnancies GBS+  2. Hx CS with  x3: counseled on risks of uterine rupture <1%, accepts risk and desires to proceed  3. Depression: stable on Zoloft 150mg daily   4. Lacunar space under placenta: consider growth scan per [facility], discussed with patient and she declines f/up scan unless concerns with FH measurement which has always been fine  5.   RLE calf tenderness and prominent varicosities: doppler was neg for DVT     Estimated Date of Delivery: 22

## 2021-12-27 ENCOUNTER — ROUTINE PRENATAL (OUTPATIENT)
Dept: FAMILY MEDICINE CLINIC | Age: 36
End: 2021-12-27

## 2021-12-27 VITALS
TEMPERATURE: 97.1 F | SYSTOLIC BLOOD PRESSURE: 101 MMHG | OXYGEN SATURATION: 98 % | RESPIRATION RATE: 16 BRPM | BODY MASS INDEX: 31.18 KG/M2 | DIASTOLIC BLOOD PRESSURE: 65 MMHG | WEIGHT: 165 LBS

## 2021-12-27 DIAGNOSIS — Z34.90 ENCOUNTER FOR SUPERVISION OF NORMAL PREGNANCY, ANTEPARTUM, UNSPECIFIED GRAVIDITY: Primary | ICD-10-CM

## 2021-12-27 DIAGNOSIS — I83.93 VARICOSE VEINS OF BOTH LOWER EXTREMITIES, UNSPECIFIED WHETHER COMPLICATED: ICD-10-CM

## 2021-12-27 DIAGNOSIS — Z98.891 HISTORY OF VBAC: ICD-10-CM

## 2021-12-27 DIAGNOSIS — F41.8 DEPRESSION WITH ANXIETY: ICD-10-CM

## 2021-12-27 LAB — GRBS, EXTERNAL: POSITIVE

## 2021-12-27 PROCEDURE — 0502F SUBSEQUENT PRENATAL CARE: CPT | Performed by: FAMILY MEDICINE

## 2021-12-27 NOTE — PROGRESS NOTES
Baby moving, is less uncomfortable now, feels baby shifted positions    36yo  @ 38w4d by LMP c/w 14 wk scan  1. IUP: RH pos, declines genetic testing, GTT+CBC ok, GBS and Covid pending  2. Hx CS with  x3: counseled on risks of uterine rupture <1%, accepts risk and desires to proceed  3. Depression: stable on Zoloft 150mg daily   4. Lacunar space under placenta: consider growth scan per [facility], discussed with patient and she declines f/up scan unless concerns with FH measurement which has always been fine  5.   RLE calf tenderness and prominent varicosities: doppler was neg for DVT     Estimated Date of Delivery: 22

## 2021-12-27 NOTE — PROGRESS NOTES
Chief Complaint   Patient presents with    Routine Prenatal Visit       Patient identified with 2 patient identifiers (name and D. O. B)    Patient is a  at 38w4d    Leakage of Fluid: NO  Vaginal Bleeding: NO  Fetal Movement: YES  Prenatal vitamins: YES  Having Contractions: NO  Pain: NO    Visit Vitals  /65 (BP 1 Location: Left upper arm, BP Patient Position: Sitting, BP Cuff Size: Adult)   Temp 97.1 °F (36.2 °C) (Temporal)   Resp 16   Wt 165 lb (74.8 kg)   LMP 2021   SpO2 98%   BMI 31.18 kg/m²       Immunization History   Administered Date(s) Administered    Influenza Vaccine Split 10/11/2012    Tdap 2015, 2019

## 2021-12-29 LAB
BACTERIA SPEC CULT: ABNORMAL
SERVICE CMNT-IMP: ABNORMAL

## 2021-12-30 ENCOUNTER — ANESTHESIA (OUTPATIENT)
Dept: LABOR AND DELIVERY | Age: 36
End: 2021-12-30

## 2021-12-30 ENCOUNTER — ANESTHESIA EVENT (OUTPATIENT)
Dept: LABOR AND DELIVERY | Age: 36
End: 2021-12-30

## 2021-12-30 ENCOUNTER — HOSPITAL ENCOUNTER (INPATIENT)
Age: 36
LOS: 2 days | Discharge: HOME | End: 2022-01-01
Attending: FAMILY MEDICINE | Admitting: OBSTETRICS & GYNECOLOGY

## 2021-12-30 DIAGNOSIS — N85.A UTERINE SCAR FROM PREVIOUS CESAREAN DELIVERY: ICD-10-CM

## 2021-12-30 LAB
ABO + RH BLD: NORMAL
BASOPHILS # BLD: 0.1 K/UL (ref 0–0.1)
BASOPHILS NFR BLD: 1 % (ref 0–1)
BLOOD GROUP ANTIBODIES SERPL: NORMAL
COMMENT, HOLDF: NORMAL
COVID-19 RAPID TEST, COVR: NOT DETECTED
DIFFERENTIAL METHOD BLD: ABNORMAL
EOSINOPHIL # BLD: 0.1 K/UL (ref 0–0.4)
EOSINOPHIL NFR BLD: 2 % (ref 0–7)
ERYTHROCYTE [DISTWIDTH] IN BLOOD BY AUTOMATED COUNT: 14.2 % (ref 11.5–14.5)
HCT VFR BLD AUTO: 31.9 % (ref 35–47)
HGB BLD-MCNC: 10.3 G/DL (ref 11.5–16)
IMM GRANULOCYTES # BLD AUTO: 0.2 K/UL (ref 0–0.04)
IMM GRANULOCYTES NFR BLD AUTO: 2 % (ref 0–0.5)
LYMPHOCYTES # BLD: 1.7 K/UL (ref 0.8–3.5)
LYMPHOCYTES NFR BLD: 18 % (ref 12–49)
MCH RBC QN AUTO: 27.6 PG (ref 26–34)
MCHC RBC AUTO-ENTMCNC: 32.3 G/DL (ref 30–36.5)
MCV RBC AUTO: 85.5 FL (ref 80–99)
MONOCYTES # BLD: 1 K/UL (ref 0–1)
MONOCYTES NFR BLD: 10 % (ref 5–13)
NEUTS SEG # BLD: 6.6 K/UL (ref 1.8–8)
NEUTS SEG NFR BLD: 67 % (ref 32–75)
NRBC # BLD: 0 K/UL (ref 0–0.01)
NRBC BLD-RTO: 0 PER 100 WBC
PLATELET # BLD AUTO: 251 K/UL (ref 150–400)
PMV BLD AUTO: 10.2 FL (ref 8.9–12.9)
RBC # BLD AUTO: 3.73 M/UL (ref 3.8–5.2)
SAMPLES BEING HELD,HOLD: NORMAL
SOURCE, COVRS: NORMAL
SPECIMEN EXP DATE BLD: NORMAL
WBC # BLD AUTO: 9.6 K/UL (ref 3.6–11)

## 2021-12-30 PROCEDURE — 74011000250 HC RX REV CODE- 250: Performed by: NURSE ANESTHETIST, CERTIFIED REGISTERED

## 2021-12-30 PROCEDURE — 74011250636 HC RX REV CODE- 250/636: Performed by: OBSTETRICS & GYNECOLOGY

## 2021-12-30 PROCEDURE — 74011250637 HC RX REV CODE- 250/637: Performed by: STUDENT IN AN ORGANIZED HEALTH CARE EDUCATION/TRAINING PROGRAM

## 2021-12-30 PROCEDURE — 74011250636 HC RX REV CODE- 250/636: Performed by: NURSE ANESTHETIST, CERTIFIED REGISTERED

## 2021-12-30 PROCEDURE — 00HU33Z INSERTION OF INFUSION DEVICE INTO SPINAL CANAL, PERCUTANEOUS APPROACH: ICD-10-PCS | Performed by: ANESTHESIOLOGY

## 2021-12-30 PROCEDURE — 99284 EMERGENCY DEPT VISIT MOD MDM: CPT

## 2021-12-30 PROCEDURE — 65270000029 HC RM PRIVATE

## 2021-12-30 PROCEDURE — 74011250636 HC RX REV CODE- 250/636: Performed by: STUDENT IN AN ORGANIZED HEALTH CARE EDUCATION/TRAINING PROGRAM

## 2021-12-30 PROCEDURE — 75410000002 HC LABOR FEE PER 1 HR: Performed by: FAMILY MEDICINE

## 2021-12-30 PROCEDURE — 76060000078 HC EPIDURAL ANESTHESIA: Performed by: NURSE ANESTHETIST, CERTIFIED REGISTERED

## 2021-12-30 PROCEDURE — 75410000003 HC RECOV DEL/VAG/CSECN EA 0.5 HR: Performed by: FAMILY MEDICINE

## 2021-12-30 PROCEDURE — 2709999900 HC NON-CHARGEABLE SUPPLY

## 2021-12-30 PROCEDURE — 74011250636 HC RX REV CODE- 250/636: Performed by: FAMILY MEDICINE

## 2021-12-30 PROCEDURE — 75410000000 HC DELIVERY VAGINAL/SINGLE: Performed by: FAMILY MEDICINE

## 2021-12-30 PROCEDURE — 77030005513 HC CATH URETH FOL11 MDII -B

## 2021-12-30 PROCEDURE — 85025 COMPLETE CBC W/AUTO DIFF WBC: CPT

## 2021-12-30 PROCEDURE — 36415 COLL VENOUS BLD VENIPUNCTURE: CPT

## 2021-12-30 PROCEDURE — 77030014125 HC TY EPDRL BBMI -B: Performed by: ANESTHESIOLOGY

## 2021-12-30 PROCEDURE — 77030010848 HC CATH INTUTR PRSS KOLB -B

## 2021-12-30 PROCEDURE — 86900 BLOOD TYPING SEROLOGIC ABO: CPT

## 2021-12-30 PROCEDURE — 74011000258 HC RX REV CODE- 258: Performed by: OBSTETRICS & GYNECOLOGY

## 2021-12-30 PROCEDURE — 87635 SARS-COV-2 COVID-19 AMP PRB: CPT

## 2021-12-30 RX ORDER — IBUPROFEN 800 MG/1
800 TABLET ORAL EVERY 8 HOURS
Status: DISCONTINUED | OUTPATIENT
Start: 2021-12-30 | End: 2022-01-01 | Stop reason: HOSPADM

## 2021-12-30 RX ORDER — FENTANYL/BUPIVACAINE/NS/PF 2-1250MCG
10 PREFILLED PUMP RESERVOIR EPIDURAL CONTINUOUS
Status: DISCONTINUED | OUTPATIENT
Start: 2021-12-30 | End: 2021-12-30

## 2021-12-30 RX ORDER — NALOXONE HYDROCHLORIDE 0.4 MG/ML
0.4 INJECTION, SOLUTION INTRAMUSCULAR; INTRAVENOUS; SUBCUTANEOUS AS NEEDED
Status: DISCONTINUED | OUTPATIENT
Start: 2021-12-30 | End: 2021-12-30

## 2021-12-30 RX ORDER — SODIUM CHLORIDE 0.9 % (FLUSH) 0.9 %
5-40 SYRINGE (ML) INJECTION AS NEEDED
Status: DISCONTINUED | OUTPATIENT
Start: 2021-12-30 | End: 2021-12-30

## 2021-12-30 RX ORDER — OXYTOCIN/RINGER'S LACTATE 30/500 ML
500 PLASTIC BAG, INJECTION (ML) INTRAVENOUS
Status: DISCONTINUED | OUTPATIENT
Start: 2021-12-30 | End: 2022-01-01 | Stop reason: HOSPADM

## 2021-12-30 RX ORDER — OXYTOCIN/RINGER'S LACTATE 30/500 ML
87.3 PLASTIC BAG, INJECTION (ML) INTRAVENOUS AS NEEDED
Status: DISCONTINUED | OUTPATIENT
Start: 2021-12-30 | End: 2022-01-01 | Stop reason: HOSPADM

## 2021-12-30 RX ORDER — SERTRALINE HYDROCHLORIDE 50 MG/1
150 TABLET, FILM COATED ORAL DAILY
Status: DISCONTINUED | OUTPATIENT
Start: 2021-12-30 | End: 2022-01-01 | Stop reason: HOSPADM

## 2021-12-30 RX ORDER — EPHEDRINE SULFATE/0.9% NACL/PF 50 MG/5 ML
10 SYRINGE (ML) INTRAVENOUS
Status: DISCONTINUED | OUTPATIENT
Start: 2021-12-30 | End: 2021-12-30

## 2021-12-30 RX ORDER — OXYTOCIN/RINGER'S LACTATE 30/500 ML
87.3 PLASTIC BAG, INJECTION (ML) INTRAVENOUS AS NEEDED
Status: DISCONTINUED | OUTPATIENT
Start: 2021-12-30 | End: 2021-12-30

## 2021-12-30 RX ORDER — LIDOCAINE HYDROCHLORIDE AND EPINEPHRINE 15; 5 MG/ML; UG/ML
INJECTION, SOLUTION EPIDURAL AS NEEDED
Status: DISCONTINUED | OUTPATIENT
Start: 2021-12-30 | End: 2021-12-30 | Stop reason: HOSPADM

## 2021-12-30 RX ORDER — ACETAMINOPHEN 325 MG/1
650 TABLET ORAL
Status: DISCONTINUED | OUTPATIENT
Start: 2021-12-30 | End: 2021-12-30

## 2021-12-30 RX ORDER — SODIUM CHLORIDE, SODIUM LACTATE, POTASSIUM CHLORIDE, CALCIUM CHLORIDE 600; 310; 30; 20 MG/100ML; MG/100ML; MG/100ML; MG/100ML
125 INJECTION, SOLUTION INTRAVENOUS CONTINUOUS
Status: DISCONTINUED | OUTPATIENT
Start: 2021-12-30 | End: 2021-12-30

## 2021-12-30 RX ORDER — ONDANSETRON 2 MG/ML
4 INJECTION INTRAMUSCULAR; INTRAVENOUS
Status: DISCONTINUED | OUTPATIENT
Start: 2021-12-30 | End: 2021-12-30

## 2021-12-30 RX ORDER — OXYTOCIN/RINGER'S LACTATE 30/500 ML
10 PLASTIC BAG, INJECTION (ML) INTRAVENOUS AS NEEDED
Status: DISCONTINUED | OUTPATIENT
Start: 2021-12-30 | End: 2021-12-30

## 2021-12-30 RX ORDER — NALOXONE HYDROCHLORIDE 0.4 MG/ML
0.4 INJECTION, SOLUTION INTRAMUSCULAR; INTRAVENOUS; SUBCUTANEOUS AS NEEDED
Status: DISCONTINUED | OUTPATIENT
Start: 2021-12-30 | End: 2022-01-01 | Stop reason: HOSPADM

## 2021-12-30 RX ORDER — SODIUM CHLORIDE 0.9 % (FLUSH) 0.9 %
5-40 SYRINGE (ML) INJECTION EVERY 8 HOURS
Status: DISCONTINUED | OUTPATIENT
Start: 2021-12-30 | End: 2021-12-30

## 2021-12-30 RX ORDER — OXYTOCIN/RINGER'S LACTATE 30/500 ML
10 PLASTIC BAG, INJECTION (ML) INTRAVENOUS AS NEEDED
Status: DISCONTINUED | OUTPATIENT
Start: 2021-12-30 | End: 2022-01-01 | Stop reason: HOSPADM

## 2021-12-30 RX ORDER — CALCIUM CARBONATE 200(500)MG
200 TABLET,CHEWABLE ORAL
Status: DISCONTINUED | OUTPATIENT
Start: 2021-12-30 | End: 2021-12-30

## 2021-12-30 RX ORDER — BUPIVACAINE HYDROCHLORIDE 5 MG/ML
INJECTION, SOLUTION EPIDURAL; INTRACAUDAL AS NEEDED
Status: DISCONTINUED | OUTPATIENT
Start: 2021-12-30 | End: 2021-12-30 | Stop reason: HOSPADM

## 2021-12-30 RX ORDER — OXYTOCIN/RINGER'S LACTATE 30/500 ML
0-20 PLASTIC BAG, INJECTION (ML) INTRAVENOUS
Status: DISCONTINUED | OUTPATIENT
Start: 2021-12-30 | End: 2021-12-30

## 2021-12-30 RX ADMIN — SODIUM CHLORIDE, POTASSIUM CHLORIDE, SODIUM LACTATE AND CALCIUM CHLORIDE 1000 ML: 600; 310; 30; 20 INJECTION, SOLUTION INTRAVENOUS at 09:15

## 2021-12-30 RX ADMIN — IBUPROFEN 800 MG: 800 TABLET, FILM COATED ORAL at 21:17

## 2021-12-30 RX ADMIN — OXYTOCIN 1 MILLI-UNITS/MIN: 10 INJECTION, SOLUTION INTRAMUSCULAR; INTRAVENOUS at 09:22

## 2021-12-30 RX ADMIN — Medication 10 ML/HR: at 10:23

## 2021-12-30 RX ADMIN — FAMOTIDINE 20 MG: 10 INJECTION, SOLUTION INTRAVENOUS at 12:33

## 2021-12-30 RX ADMIN — ANTACID TABLETS 200 MG: 500 TABLET, CHEWABLE ORAL at 12:31

## 2021-12-30 RX ADMIN — SODIUM CHLORIDE 2.5 MILLION UNITS: 900 INJECTION, SOLUTION INTRAVENOUS at 09:21

## 2021-12-30 RX ADMIN — SODIUM CHLORIDE, POTASSIUM CHLORIDE, SODIUM LACTATE AND CALCIUM CHLORIDE 500 ML: 600; 310; 30; 20 INJECTION, SOLUTION INTRAVENOUS at 10:15

## 2021-12-30 RX ADMIN — SERTRALINE HYDROCHLORIDE 150 MG: 50 TABLET ORAL at 09:23

## 2021-12-30 RX ADMIN — BUPIVACAINE HYDROCHLORIDE 7 ML: 5 INJECTION, SOLUTION EPIDURAL; INTRACAUDAL; PERINEURAL at 10:09

## 2021-12-30 RX ADMIN — SODIUM CHLORIDE 5 MILLION UNITS: 900 INJECTION INTRAVENOUS at 05:24

## 2021-12-30 RX ADMIN — Medication 999 ML/HR: at 11:38

## 2021-12-30 RX ADMIN — SODIUM CHLORIDE, POTASSIUM CHLORIDE, SODIUM LACTATE AND CALCIUM CHLORIDE 125 ML/HR: 600; 310; 30; 20 INJECTION, SOLUTION INTRAVENOUS at 05:24

## 2021-12-30 RX ADMIN — SODIUM CHLORIDE, POTASSIUM CHLORIDE, SODIUM LACTATE AND CALCIUM CHLORIDE 125 ML/HR: 600; 310; 30; 20 INJECTION, SOLUTION INTRAVENOUS at 10:37

## 2021-12-30 RX ADMIN — LIDOCAINE HYDROCHLORIDE AND EPINEPHRINE 3 ML: 15; 5 INJECTION, SOLUTION EPIDURAL at 10:08

## 2021-12-30 NOTE — ANESTHESIA PREPROCEDURE EVALUATION
Relevant Problems   NEUROLOGY   (+) Depression with anxiety       Anesthetic History   No history of anesthetic complications            Review of Systems / Medical History  Patient summary reviewed, nursing notes reviewed and pertinent labs reviewed    Pulmonary  Within defined limits                 Neuro/Psych             Comments: Anxiety Cardiovascular  Within defined limits                Exercise tolerance: >4 METS     GI/Hepatic/Renal               Comments: IBS Endo/Other  Within defined limits           Other Findings   Comments: Infertility           Physical Exam    Airway  Mallampati: II  TM Distance: < 4 cm  Neck ROM: normal range of motion   Mouth opening: Normal     Cardiovascular    Rhythm: regular           Dental  No notable dental hx       Pulmonary  Breath sounds clear to auscultation               Abdominal         Other Findings            Anesthetic Plan    ASA: 2  Anesthesia type: epidural      Post-op pain plan if not by surgeon: indwelling epidural catheter      Anesthetic plan and risks discussed with: Patient      Late entry - preop done, questions answered, and consent obtained prior to procedure; note entered after procedure at 10:21 AM

## 2021-12-30 NOTE — PROGRESS NOTES
0435: Pt ambulatory onto unit with steady gait accompanied by FOB. Pt reporting uterine contractions beginning 0130. Pt denies bleeding or LOF. Pt reports positive fetal movement. RN placing EFM/TOCO.    Brit Mc MD at pt bedside assessing pt. MD performing SVE: 4/80/-2. BB. Pt tolerating well. MD Abbe Morales RN to continue with admitting pt at this time. 0700: Bedside, Verbal and Written shift change report given to Carly Saxena RN (oncoming nurse) by MAKSIM Finnegan RN (offgoing nurse). Report included the following information SBAR, Kardex, Procedure Summary, Intake/Output, MAR, Accordion, Recent Results, Med Rec Status, Procedure Verification, Quality Measures and Dual Neuro Assessment.

## 2021-12-30 NOTE — PROGRESS NOTES
12/30/2021  10:15 AM    CM met with JUANITO to complete initial assessment and begin discharge planning. MOB verified and confirmed demographics. JUNAITO lives with spouse/FOB- Shandra Matthews ([phone]), along with their 8, 6,11, and 2yr old, at the address on file. JUANITO does not work and plans to be home with infant. FOB is employed and will be taking adequate time  off. JUANITO reports she has good family support, and feels like she has the support she needs when she returns home. JUANITO plans to breast feed baby and has pump to use at home. Dr. Leticia Fischer will provide follow up care for infant. JUANITO has car seat, bassinet/crib, clothing, bottles and all necessary supplies for baby. JUANITO is part of a [facility] group for medical coverage and she is not interested in applying for Medicaid at this time. Care Management Interventions  PCP Verified by CM: Yes ([facility])  Mode of Transport at Discharge:  Other (see comment)  Transition of Care Consult (CM Consult): Discharge Planning  Support Systems: Other Family Member(s),Spouse/Significant Other  Confirm Follow Up Transport: Family  Discharge Location  Discharge Placement: Home with family assistance  Ana Hurt

## 2021-12-30 NOTE — ROUTINE PROCESS
Bedside and Verbal shift change report given to Miller Mack RN (oncoming nurse) by Frances Flores RN (offgoing nurse). Report included the following information SBAR, Kardex and MAR.

## 2021-12-30 NOTE — H&P
Labor and Delivery Admission Note  2021    32 yo  at 39+0 presents c/o ctx's q 5 min since around 0130 this morning. Reports loosing mucous plug but no gush of fluid and only scant spotting associated with the mucous. Good FM. Denies HA/vision changes/RUQ pain. Denies cough/fever/SOB. Was 1-2 cm in the office. Primary OB:  Dr Izetta Carrel    [facility]:  First delivery LTCS f/b three 's    PNL:  A pos, Ab neg, RPR NR, HBsAg neg, HIV NR, RI, VZV I, 1 hr , GBS pos    Past Medical History:   Diagnosis Date    IBS (irritable bowel syndrome)     Infertility     Infertility, female     Psychiatric problem     depression on meds now, anorexia in past       Past Surgical History:   Procedure Laterality Date    HX HEMORRHOIDECTOMY      [place]  DELIVERY ONLY       for macrosomia(-13)       OB/GYN: Prior LTCS f/b  x 3    Meds:   Current Facility-Administered Medications   Medication Dose Route Frequency    lactated Ringers infusion  125 mL/hr IntraVENous CONTINUOUS    lactated ringers bolus infusion 500 mL  500 mL IntraVENous PRN    sodium chloride (NS) flush 5-40 mL  5-40 mL IntraVENous Q8H    sodium chloride (NS) flush 5-40 mL  5-40 mL IntraVENous PRN    oxytocin (PITOCIN) 10 unit bolus from bag  10 Units IntraVENous PRN    And    oxytocin (PITOCIN) 30 units/500 ml LR  87.3 anju-units/min IntraVENous PRN    naloxone (NARCAN) injection 0.4 mg  0.4 mg IntraVENous PRN    penicillin G potassium (PFIZERPEN) 5 Million Units in 0.9% sodium chloride (MBP/ADV) 100 mL MBP  5 Million Units IntraVENous ONCE    penicillin G pot (PFIZERPEN) 2.5 Million Units in 50 ml 0.9% NaCl  2.5 Million Units IntraVENous Q4H    ondansetron (ZOFRAN) injection 4 mg  4 mg IntraVENous Q4H PRN    acetaminophen (TYLENOL) tablet 650 mg  650 mg Oral Q4H PRN       Allergies: No Known Allergies    Pertinent ROS: Denies F/C. Denies N/V. Denies HA/vision changes. Denies CP/Palp. Denies constipation. + Diarrhea. Denies urgency/dysuria. Denies cough/wheeze/SOB/sore throat. Denies cold/heat intolerance. Denies rash/bruise/bleed. Denies allergic reaction. Denies extremity weakness/swelling/pain. Denies anx/dep. Family History   Problem Relation Age of Onset    Asthma Brother     Hypertension Maternal Grandfather        Social History     Socioeconomic History    Marital status:      Spouse name: Jazmyne Vann    Number of children: 3    Years of education: Not on file    Highest education level: Not on file   Occupational History    Not on file   Tobacco Use    Smoking status: Never Smoker    Smokeless tobacco: Never Used   Vaping Use    Vaping Use: Never used   Substance and Sexual Activity    Alcohol use: No    Drug use: No    Sexual activity: Yes     Partners: Male     Birth control/protection: None         OBJECTIVE:  Gravid , female NAD, A&O  No data recorded. Visit Vitals  Ht 5' 2\" (1.575 m)   Wt 74.4 kg (164 lb)   LMP 2021   BMI 30.00 kg/m²       Labs:    Lab Results   Component Value Date/Time    WBC 8.2 10/13/2021 11:57 AM       Exam:  HEENT:  normal   Lungs:  clear  Cor:  RRR  Abdomen:  Fundal height CWD                    Soft between UC                    Clinical EFW 7#  LE: neg edema  Fetal heart rate tracin baseline, moderate variability, +15x15 accels, single variable decel, Cat I/reactive  Contraction pattern: q 6 min  Cervix:  4/80/-3/BB/cephalic  Fluid:  Intact  Pelvimetry:  AP-good                      Arch- adequate                      Sidewalls- adequate                      Pelvis feels adequate for fetus. Impression:  31 y.o. Q1V9950 at 39+0. Labor. Reassuring fetal status. TOLAC. GBS pos. Grand multip. Rh pos.  RI. Plan:   1. Admit for delivery  2. PIV/CBC/Type & Screen  3. Epidural prn  4. GBS proph PCN  5. AROM/Augment prn  6. Anticipate vaginal delivery.    for standard fetal/maternal indications    MD Danielle Holman AMERICA Gabriel MD, [facility]

## 2021-12-30 NOTE — PROGRESS NOTES
Labor Progress Note  Patient seen, fetal heart rate and contraction pattern evaluated, patient examined with Dr. Jarocho Cool.  Visit Vitals  /62   Pulse 77   Temp 97.9 °F (36.6 °C)   Resp 16   Ht 5' 2\" (1.575 m)   Wt 74.4 kg (164 lb)   SpO2 98%   BMI 30.00 kg/m²       Physical Exam:  Cervical Exam:  Cervical Exam  Dilation (cm): 4-5  Eff: 80 %  Station: -2  Vaginal exam done by? : Dr. Miki Shay Status: Bulging  Membranes:  Artificial Rupture of Membranes; Amniotic Fluid: medium amount of thin meconium fluid  Uterine Activity: Rare contractions (1 every 5-10mins)  Fetal Heart Rate: Reactive  Baseline: 130s per minute  Variability: moderate  Accelerations: yes    Assessment/Plan     Candace Esparza is a 31 y.o. female at 39w0d here for labor in s/o prior CS and VBACx3. SIUP: Desires TOLAC. - PNL: A+, Ab neg, GBS +, HepBSAg/HIV/RPR/GC/CT neg. Rubella/VZV immune. Passed 1hr GTT. - 22wk US: anterior placenta, normal anatomy, no EFW calc, lacunar space with uncertain significance (pt declined growth scan)  - AROM and IUPC placed at 0930 w/ Dr. Jarocho Cool supervision  - Likely start pitocin w/ IUPC   - Desires epidural   - LR at MIVF     GBS + On 12/27.  - PCN until delivery    Depression: Stable on Zoloft 150mg. Last took yesterday AM.  - Continue Zoloft 150mg daily  - Monitor EPDS post partum    Lacunar space under placenta: Uncertain significance per MFM. Pt declined growth scan at 26-28 weeks. RLE calf tenderness and prominent varicosities: US negative for DVT outpatient. Pt discussed with Dr. Jarocho Cool [facility] attending).    Arianna Webster MD  Family Practice Resident

## 2021-12-30 NOTE — L&D DELIVERY NOTE
Delivery Summary    Patient: Shani Avila MRN: [id number]  SSN: [id number]    YOB: 1985  Age: 31 y.o. Sex: female       Information for the patient's :  Dionte Modi [[id number]]     Patient progressed well to C/C/+2 and pushed for approximately 2 min w/  over of a liveborn female infant, Apgar scores were 8/9. Head delivered slightly JAQUI. Loose nuchal cord x 1, manually reduced. Anterior (left) shoulder delivered w/ single maternal effort w/ posterior shoulder and body following easily. Infant placed on maternal abdomen. Delayed cord clamping x 1 min. Cord clamped x 2 and cut by  FOB. Pitocin infusion initiated. Placenta delivered spontaneously intact w/ 3 v cord. Perineum inspected. Fundus firm at U. Mother and baby bonding in LDR. Labor Events:    Labor: No    Steroids: None   Cervical Ripening Date/Time:       Cervical Ripening Type: None   Antibiotics During Labor: Yes   Rupture Identifier:      Rupture Date/Time: 2021 9:31 AM   Rupture Type: AROM;Bulging   Amniotic Fluid Volume: Moderate    Amniotic Fluid Description: Clear;Meconium    Amniotic Fluid Odor:      Induction: None       Induction Date/Time:        Indications for Induction:      Augmentation: Oxytocin   Augmentation Date/Time: 19:21 AM   Indications for Augmentation: Ineffective Contraction Pattern   Labor complications:          Additional complications:        Delivery Events:  Indications For Episiotomy:     Episiotomy: None   Perineal Laceration(s):     Repaired:     Periurethral Laceration Location:      Repaired:     Labial Laceration Location:     Repaired:     Sulcal Laceration Location:     Repaired:     Vaginal Laceration Location:     Repaired:     Cervical Laceration Location:     Repaired:     Repair Suture:     Number of Repair Packets:     Estimated Blood Loss (ml):  ml   Quantitative Blood Loss (ml)                Delivery Date: 2021    Delivery Time: 12:37 PM  Delivery Type: Vaginal, Spontaneous  Sex:  Female    Gestational Age: 39w0d   Delivery Clinician:  Ad Adhikari  Living Status: Living   Delivery Location: L&D            APGARS  One minute Five minutes Ten minutes   Skin color: 1   1        Heart rate: 2   2        Grimace: 2   2        Muscle tone: 1   2        Breathin   2        Totals: 8   9            Presentation: Vertex    Position: Left Occiput Anterior  Resuscitation Method:  Suctioning-bulb; Tactile Stimulation     Meconium Stained: Thin      Cord Information: 3 Vessels  Complications: Nuchal Cord With Compressions  Cord around: head  Delayed cord clamping? Yes  Cord clamped date/time:2021 12:38 PM  Disposition of Cord Blood: Discard    Blood Gases Sent?: No    Placenta:  Date/Time: 2021 12:42 PM  Removal: Expressed      Appearance: Normal      Measurements:  Birth Weight:        Birth Length:        Head Circumference:        Chest Circumference:       Abdominal Girth: Other Providers:   MEGAN ADHIKARI;LORIE VEGA;BERT MEDINA;;;;;;;;RODRIGO DAVIS;PERLA MENDENHALL;PONCE DAVIS;KATELYNN NAIK, Obstetrician;Primary Nurse;Primary  Nurse;Nicu Nurse;Neonatologist;Anesthesiologist;Crna;Nurse Practitioner;Midwife;Nursery Nurse;Charge Nurse;Staff Nurse;Resident; Resident           Group B Strep:   Lab Results   Component Value Date/Time    [place] External Positive 2021 12:00 AM

## 2021-12-30 NOTE — PROGRESS NOTES
0700: SBAR report received from [place]    0730: RN at bedside for assessment. Pt reports painful contractions but denies any needs at this time. Pt given labor warm-up routine and instructed to call RN with any changes. 6015: Pt reconnected to EFM    0931: Dr. Bibi Slaughter and Dr. Margret Latham at bedside. AROM thin mec.    0933: IUPC inserted by MD    1003: [place] CRNA at bedside for epidural placement. Time out. 1158: SVE by RN . Dr. Bibi Slaughter at bedside reviewing strip. 1228: Dr. Bibi Slaughter at bedside. SVE, complete. 1236: Patient actively pushing. RN remains in continuous attendance at the bedside. Assessment & evaluation of fetal heart rate ongoing via continuous EFM. 1237: RN remained at bedside throughout pushing. EFM continuously assessed.  of viable infant. 1636: Pt ambulated to bathroom with RN and voided.  Pads changed and mary jo care completed    1720: SBAR report given to Roger Sheikh RN

## 2021-12-30 NOTE — PROGRESS NOTES
[address] [facility] [address]   Office [phone], Fax [phone]                                Post-Partum Day Number 1 Progress Note    Patient doing well post-partum without significant complaint. Pain well controlled. Lochia controlled. Tolerating  diet. Ambulating. Voiding without difficulty. Passing flatus. No BM. Vitals:    Patient Vitals for the past 8 hrs:   BP Temp Pulse Resp SpO2   21 0803 96/60 97.7 °F (36.5 °C) 86 16 96 %     Temp (24hrs), Av.1 °F (36.7 °C), Min:97.7 °F (36.5 °C), Max:98.4 °F (36.9 °C)      Exam:  Patient without distress. CTAB, no w/r/r/c.               RRR, +S1 and S2, no m/r/g. Abdomen soft, fundus firm at level of umbilicus, nontender. Lower extremities:  Mild edema bilaterally. No palpable cords or tenderness. Varicosities noted. Lab/Data Review:  No results found for this or any previous visit (from the past 12 hour(s)). Assessment and Plan:    Cynthia Bosworth is a 31 y.o.  s/p spontaneous  at 39 weeks 0 days. Pregnancy was complicated by GBS+, depression, RLE calf tenderness (w/ neg Doppler), and lacunar space under placenta with uncertain significance. Patient appears to be having uncomplicated post-partum course. PPD1 s/p  at 39w0d   - Continue routine perineal care and maternal education.    - Plan discharge tomorrow if no problems occur. GBS + : S/p treatment with PCN     Depression: Stable on Zoloft 150mg.   - Continue Zoloft 150mg daily  - F/u EPDS     RLE calf tenderness and prominent varicosities: US negative for DVT outpatient. No calf tenderness currently.   - Monitor    Patient discussed with Dr. Anastacia Dacosta MD  Family Medicine Resident

## 2021-12-30 NOTE — LACTATION NOTE
This note was copied from a baby's chart. Reviewed breastfeeding basics:  Supply and demand,  stomach size, early  Feeding cues, skin to skin, positioning and baby led latch-on, assymetrical latch with signs of good, deep latch vs shallow, feeding frequency and duration, and log sheet for tracking infant feedings and output. Breastfeeding Booklet and Warm line information given. Discussed typical  weight loss and the importance of infant weight checks with pediatrician 1-2 post discharge. Mom states breat feeding going well. Not seen at breast.    Pt will successfully establish breastfeeding by feeding in response to early feeding cues   or wake every 3h, will obtain deep latch, and will keep log of feedings/output. Taught to BF at hunger cues and or q 2-3 hrs and to offer 10-20 drops of hand expressed colostrum at any non-feeds.       Breast Assessment  Left Breast: Medium  Left Nipple: Everted,Intact  Right Breast: Medium  Right Nipple: Everted,Intact  Breast- Feeding Assessment  Attends Breast-Feeding Classes: No  Breast-Feeding Experience: Yes (at least 9 months with 4 others)  Breast Trauma/Surgery: No  Type/Quality: Good (per mom)

## 2021-12-30 NOTE — PROGRESS NOTES
Labor Progress Note  Patient seen, fetal heart rate and contraction pattern evaluated, patient examined with Dr. Brenna Hess.  Visit Vitals  BP (!) 100/58   Pulse 81   Temp 97.9 °F (36.6 °C)   Resp 16   Ht 5' 2\" (1.575 m)   Wt 164 lb (74.4 kg)   SpO2 100%   BMI 30.00 kg/m²       Physical Exam:  Cervical Exam  Dilation (cm): 7  Eff: 80 %  Station: -2  Vaginal exam done by? : GUALBERTO Patrick RN  Membrane Status: AROM  Membranes:  Artificial Rupture of Membranes; Amniotic Fluid: medium amount of thin meconium fluid  Uterine Activity: Duane every 3 minutes for several contractions then breaks for several minutes. Fetal Heart Rate: Reactive  Baseline: 130s per minute  Variability: moderate  Accelerations: yes  Decelerations: 2 variables, changed patient position and FHT improved    Assessment/Plan     Jolly Vazquez is a 31 y.o. female at 39w0d here for labor in s/o prior CS and VBACx3. SIUP: Desires TOLAC. - PNL: A+, Ab neg, GBS +, HepBSAg/HIV/RPR/GC/CT neg. Rubella/VZV immune. Passed 1hr GTT. - 22wk US: anterior placenta, normal anatomy, no EFW calc, lacunar space with uncertain significance (pt declined growth scan)  - AROM and IUPC placed at 0930 w/ Dr. Brenna Hess supervision  - Pitocin titration running, if variables return will give break  - Epidural placed  - LR at MIVF      GBS + On 12/27.  - PCN until delivery    Depression: Stable on Zoloft 150mg. Last took yesterday AM.  - Continue Zoloft 150mg daily  - Monitor EPDS post partum    Lacunar space under placenta: Uncertain significance per MFM. Pt declined growth scan at 26-28 weeks. RLE calf tenderness and prominent varicosities: US negative for DVT outpatient. Pt discussed with Dr. Brenna Hess [facility] attending).    Rian Scott MD  Family Practice Resident

## 2021-12-31 PROCEDURE — 65270000029 HC RM PRIVATE

## 2021-12-31 PROCEDURE — 74011250637 HC RX REV CODE- 250/637: Performed by: STUDENT IN AN ORGANIZED HEALTH CARE EDUCATION/TRAINING PROGRAM

## 2021-12-31 PROCEDURE — 2709999900 HC NON-CHARGEABLE SUPPLY

## 2021-12-31 RX ORDER — IBUPROFEN 800 MG/1
800 TABLET ORAL
Qty: 30 TABLET | Refills: 0 | Status: SHIPPED | OUTPATIENT
Start: 2021-12-31

## 2021-12-31 RX ADMIN — IBUPROFEN 800 MG: 800 TABLET, FILM COATED ORAL at 20:55

## 2021-12-31 RX ADMIN — SERTRALINE HYDROCHLORIDE 150 MG: 50 TABLET ORAL at 09:04

## 2021-12-31 RX ADMIN — IBUPROFEN 800 MG: 800 TABLET, FILM COATED ORAL at 05:25

## 2021-12-31 RX ADMIN — IBUPROFEN 800 MG: 800 TABLET, FILM COATED ORAL at 12:32

## 2021-12-31 NOTE — DISCHARGE SUMMARY
Obstetrical Discharge Summary     Name: Jenni Levi MRN: [id number]  SSN: [id number]    YOB: 1985  Age: 31 y.o. Sex: female      Admit Date: 2021    Discharge Date: 2022     Admitting Physician: Varghese Suresh MD     Attending Physician:  Sd Sanford DO     Admission Diagnoses: [address] multipara in labor in third trimester [O80]  Uterine scar from previous  delivery [O34.219]    Discharge Diagnoses:   Information for the patient's :  Lara Vanegas [[id number]]   Delivery of a 3.765 kg female infant via Vaginal, Spontaneous on 2021 at 12:37 PM  by [address] Vest. Apgars were 8  and 9 . Additional Diagnoses:   Hospital Problems  Date Reviewed: 2020          Codes Class Noted POA    Grand multipara in labor in third trimester ICD-10-CM: O80  ICD-9-CM: 659.43  2021 Unknown        Uterine scar from previous  delivery ICD-10-CM: O34.219  ICD-9-CM: 654.21  2021 Unknown             Lab Results   Component Value Date/Time    Rubella, External Immune 2021 12:00 AM    GrBStrep, External Positive 2021 12:00 AM       Immunization(s):   Immunization History   Administered Date(s) Administered    Influenza Vaccine Split 10/11/2012    Tdap 2015, 2019        Hospital Course:   Patient is a 31 y.o. [id number] s/p  at 39 weeks 0 days. Presented for Active Labor in setting of hx of  x3. Pregnancy complicated by GBS+, depression, lacunar space under placenta, and LE varicosities. Labor was complicated by GBS+ s/p PCN tx . Delivered TLFI by Dr. Helder Gregory with small periurethral laceration that did not require repair. Normal hospital course following the delivery. On day of discharge patient reported minimal lochia, well controlled pain, and no other complaints. Discharged with pain regimen and bowel regimen. Advised to continue prenatal vitamins and home iron supplementation.  Follow up with Dr. Helder Gregory in 6 weeks.      Anemia: POA CBC 10.3. Patient was rx'd iron outpatient but has not been taking.   - Advise to continue home iron  - Consider repeat CBC outpatient    Depression: Stable on Zoloft 150mg. Last took yesterday AM. EPDS 9.  - Continue Zoloft 150 mg daily.  - Pt has follow-up scheduled for 2/10/22. GBS + On 12/27.  - S/p PCN prior to delivery      Lacunar space under placenta: Uncertain significance per MFM. Pt declined growth scan at 26-28 weeks.      RLE calf tenderness and prominent varicosities: US negative for DVT outpatient. Stable. EPDS score 9. Condition at Discharge:  Stable   Disposition: Discharge to Home    Physical exam:  Visit Vitals  BP 97/69 (BP Patient Position: At rest)   Pulse 70   Temp 98.2 °F (36.8 °C)   Resp 16   Ht 5' 2\" (1.575 m)   Wt 74.4 kg (164 lb)   LMP 04/01/2021   SpO2 97%   Breastfeeding Unknown   BMI 30.00 kg/m²       Exam:  Patient without distress.                CTAB               RRR               Abdomen soft, fundus firm at level of umbilicus, non tender               No calf tenderness        Follow-up Information     Follow up With Specialties Details Why Contact Info    Oral DO [facility] On 2/10/2022 6 week postpartum followup at [address] [address]  [phone]              Signed By:  Qasim Domingo MD    Family Medicine Resident

## 2021-12-31 NOTE — LACTATION NOTE
This note was copied from a baby's chart. Mother states baby has been nursing well - however, last night baby became fussy and baby had formula. LC reviewed early feeding cues to look for and to put baby skin to skin when noticing them. Baby last breast fed at 0920 for 20 minutes. Reviewed breastfeeding basics:  Supply and demand, breastfeed baby 8-12 times in 24 hr., feed baby on demand,  stomach size, early  Feeding cues, skin to skin, positioning and baby led latch-on, assymetrical latch with signs of good, deep latch vs shallow, feeding frequency and duration, and log sheet for tracking infant feedings and output. Breastfeeding Booklet and Warm line information given. Discussed typical  weight loss and the importance of infant weight checks with pediatrician 1-2 post discharge. Mother will successfully establish breastfeeding by feeding in response to early feeding cues   or wake every 3h, will obtain deep latch, and will keep log of feedings/output. Taught to BF at hunger cues and or q 2-3 hrs and to offer 10-20 drops of hand expressed colostrum at any non-feeds. Breast Assessment  Left Breast: Medium  Left Nipple: Everted,Intact  Right Breast: Medium  Right Nipple: Everted,Intact  Breast- Feeding Assessment  Attends Breast-Feeding Classes: No  Breast-Feeding Experience: Yes  Breast Trauma/Surgery: No  Type/Quality: Good (Per mother)  Lactation Consultant Visits  Breast-Feedings: Good  (Mother states baby last breast fed at 56 for 20 minutes. Baby was having a hearing screen done during visit.)     Encouraged mother to call [facility] for breastfeeding assistance. Mother given breastfeeding handouts and LC#.

## 2022-01-01 VITALS
DIASTOLIC BLOOD PRESSURE: 69 MMHG | HEIGHT: 62 IN | BODY MASS INDEX: 30.18 KG/M2 | OXYGEN SATURATION: 97 % | HEART RATE: 70 BPM | RESPIRATION RATE: 16 BRPM | SYSTOLIC BLOOD PRESSURE: 97 MMHG | WEIGHT: 164 LBS | TEMPERATURE: 98.2 F

## 2022-01-01 PROCEDURE — 74011250637 HC RX REV CODE- 250/637: Performed by: STUDENT IN AN ORGANIZED HEALTH CARE EDUCATION/TRAINING PROGRAM

## 2022-01-01 RX ORDER — DOCUSATE SODIUM 100 MG/1
100 CAPSULE, LIQUID FILLED ORAL DAILY
Qty: 20 CAPSULE | Refills: 0 | Status: SHIPPED | OUTPATIENT
Start: 2022-01-01 | End: 2022-01-21

## 2022-01-01 RX ADMIN — SERTRALINE HYDROCHLORIDE 150 MG: 50 TABLET ORAL at 09:07

## 2022-01-01 RX ADMIN — IBUPROFEN 800 MG: 800 TABLET, FILM COATED ORAL at 06:07

## 2022-01-01 NOTE — PROGRESS NOTES
Pt off unit in stable condition via wheelchair with volunteers for discharge home per Dr. Michelle Michaels. Pt is aware to follow up in 6 weeks and has appt scheduled for 2/10/2022 at 9am with Dr. Brenna Hess. Prescriptions electronically sent to pt's pharmacy by MD.  Pt denies any HA, dizziness, N/V, or pain at this time. Infant in car seat and discharged with mother. Postpartum discharge teaching completed by this RN. Perineal supplies given to pt. Discharge papers signed by pt and RN.

## 2022-01-01 NOTE — DISCHARGE INSTRUCTIONS
Patient Discharge Instructions    July Sims / [id number] : 1985    Admitted 2021 Discharged: 2021       Please bring this form with you to show your care provider at your follow-up appointment. Primary care provider:  Herbert Chu DO    Discharging provider:  Nikole Schwartz MD  - Family Medicine Resident  Laurent Burleson DO -  Family medicine attending          ACUTE DIAGNOSES:  · [address] multipara in labor in third trimester [O80]  · Uterine scar from previous  delivery [O34.219]      FOLLOW-UP CARE RECOMMENDATIONS:    Follow-up Information     Follow up With Specialties Details Why Contact Info    Herbert Chu DO Family Medicine On 2/10/2022 6 week postpartum followup at [address] [place] [address]  [phone]            Continuing Therapy:  - Please take Motrin 800 mg, 1 tablet every 8 hours as needed for pain  - Please continue Colace 100 mg for constipation, take one tablet BID until having regular bowel movements  - Please take Ferrous Sulfate 325 mg for anemia, Take 1 tablet twice daily with Orange juice. - Please continue your prenatal vitamins    Pending test results: At the time of your discharge the following test results are still pending: None. Please make sure you review these results with your outpatient follow-up provider(s). Specific symptoms to watch for: chest pain, shortness of breath, fever, chills, nausea, vomiting, diarrhea, change in mentation, falling, weakness, bleeding. DIET/what to eat:  Regular Diet    ACTIVITY:   Activity Instructions    Do not lift anything heavier than your baby for 6 weeks. Nothing in the vagina for 6 weeks. After having a /vaginal delivery you will still need to wait about six weeks before having sex. You will have your six-week postpartum check-up at this time. You are ok to drive as long as you are not taking Percocet or other narcotic pain medication (Motrin is ok).        Wound care:  fill the mary jo bottle with warm water and squeeze it: a jet of water will shoot out the nozzle to help you cleanse and wash your perineal area. I understand that if any problems occur once I am at home I am to contact my physician. These instructions were explained to me and I had the opportunity to ask questions. I understand and acknowledge receipt of the instructions indicated above.                                                                                                                                                [de-identified] or R.N.'s Signature                                                                  Date/Time                                                                                                                                              Patient or Representative Signature                                                          Date/Time    POSTPARTUM DISCHARGE INSTRUCTIONS       Name:  Jazmyn Henley  YOB: 1985  Admission Diagnosis:  [address] multipara in labor in third trimester [O80]  Uterine scar from previous  delivery [O34.219]     Discharge Diagnosis:    Problem List as of 2022 Date Reviewed: 2020          Codes Class Noted - Resolved    [address] multipara in labor in third trimester ICD-10-CM: O80  ICD-9-CM: 659.43  2021 - Present        Uterine scar from previous  delivery ICD-10-CM: O34.219  ICD-9-CM: 654.21  2021 - Present        Irritable bowel syndrome with constipation ICD-10-CM: K58.1  ICD-9-CM: 564.1  2017 - Present        Varicose veins of both lower extremities ICD-10-CM: I83.93  ICD-9-CM: 454.9  6/3/2015 - Present        History of  ICD-10-CM: Z98.891  ICD-9-CM: V13.29  2014 - Present        Encounter for supervision of normal pregnancy, antepartum ICD-10-CM: Z34.90  ICD-9-CM: V22.1  2012 - Present        Anorexia ICD-10-CM: R63.0  ICD-9-CM: 783.0  2010 - Present Depression with anxiety ICD-10-CM: F41.8  ICD-9-CM: 300.4  2010 - Present        Urinary urgency ICD-10-CM: R39.15  ICD-9-CM: 788.63  2010 - Present        Polydipsia ICD-10-CM: R63.1  ICD-9-CM: 783.5  2010 - Present        RESOLVED: History of  premature rupture of membranes (PPROM) ICD-10-CM: Z87.59  ICD-9-CM: V13.29  2019 - 7/10/2020        RESOLVED: Pregnancy ICD-10-CM: Z34.90  ICD-9-CM: V22.2  2019 - 7/10/2020        RESOLVED: Pregnant ICD-10-CM: Z34.90  ICD-9-CM: V22.2  2015 - 2015        RESOLVED: Previous  delivery affecting pregnancy, antepartum ICD-10-CM: O34.219  ICD-9-CM: 654.23  2014 - 2015        RESOLVED: Group B Streptococcus carrier, antepartum ICD-10-CM: F14.786  ICD-9-CM: 648.93, V02.51  2013 - 4/15/2013        RESOLVED: Previous  delivery, antepartum condition or complication ZDV-64-BY: T07.483  ICD-9-CM: 654.23  2012 - 4/15/2013        RESOLVED: Active labor ICD-10-CM: NGQ7762  ICD-9-CM: Zaire Ren  3/27/2011 - 2011        RESOLVED: Supervision of normal first pregnancy ICD-10-CM: Z34.00  ICD-9-CM: V22.0  3/4/2011 - 3/30/2011        RESOLVED: Infertility female ICD-9-CM: 628.9  2010 - 2011            Attending Physician:  Rubi Jay, DO    Delivery Type:  Vaginal Childbirth: What To Expect At Home    Your Recovery: Your body will slowly heal in the next few weeks. It is easy to get too tired and overwhelmed during the first weeks after your baby is born. Changes in your hormones can shift your mood without warning. You may find it hard to meet the extra demands on your energy and time. Take it easy on yourself. Follow-up care is a key part of your treatment and safety. Be sure to make and go to all appointments, and call your doctor if you are having problems. It's also a good idea to know your test results and keep a list of the medicines you take.     How can you care for yourself at home?    Vaginal bleeding and cramps  · After delivery, you will have a bloody discharge from the vagina. This will turn pink within a week and then white or yellow after about 10 days. It may last for 2 to 4 weeks or longer, until the uterus has healed. Use pads instead of tampons until you stop bleeding. · Do not worry if you pass some blood clots, as long as they are smaller than a golf ball. If you have a tear or stitches in your vaginal area, change the pad at least every 4 hours to prevent soreness and infection. · You may have cramps for the first few days after childbirth. These are normal and occur as the uterus shrinks to normal size. Take an over-the-counter pain medicine, such as acetaminophen (Tylenol), ibuprofen (Advil, Motrin), or naproxen (Aleve), for cramps. Read and follow all instructions on the label. Do not take aspirin, because it can cause more bleeding. Do not take acetaminophen (Tylenol) and other acetaminophen containing medications (i.e. Percocet) at the same time. Breast fullness  · Your breasts may overfill (engorge) in the first few days after delivery. To help milk flow and to relieve pain, warm your breasts in the shower or by using warm, moist towels before nursing. · If you are not nursing, do not put warmth on your breasts or touch your breasts. Wear a tight bra or sports bra and use ice until the fullness goes away. This usually takes 2 to 3 days. · Put ice or a cold pack on your breast after nursing to reduce swelling and pain. Put a thin cloth between the ice and your skin. Activity  · Eat a balanced diet. Do not try to lose weight by cutting calories. Keep taking your prenatal vitamins, or take a multivitamin. · Get as much rest as you can. Try to take naps when your baby sleeps during the day. · Get some exercise every day. But do not do any heavy exercise until your doctor says it is okay.      · Wait until you are healed (about 4 to 6 weeks) before you have sexual intercourse. Your doctor will tell you when it is okay to have sex. · Talk to your doctor about birth control. You can get pregnant even before your period returns. Also, you can get pregnant while you are breast-feeding. Mental Health  · Many women get the \"baby blues\" during the first few days after childbirth. You may lose sleep, feel irritable, and cry easily. You may feel happy one minute and sad the next. Hormone changes are one cause of these emotional changes. Also, the demands of a new baby, along with visits from relatives or other family needs, add to a mother's stress. The \"baby blues\" often peak around the fourth day. Then they ease up in less than 2 weeks. · If your moodiness or anxiety lasts for more than 2 weeks, or if you feel like life is not worth living, you may have postpartum depression. This is different for each mother. Some mothers with serious depression may worry intensely about their infant's well-being. Others may feel distant from their child. Some mothers might even feel that they might harm their baby. A mother may have signs of paranoia, wondering if someone is watching her. · With all the changes in your life, you may not know if you are depressed. Pregnancy sometimes causes changes in how you feel that are similar to the symptoms of depression. · Symptoms of depression include:  · Feeling sad or hopeless and losing interest in daily activities. These are the most common symptoms of depression. · Sleeping too much or not enough. · Feeling tired. You may feel as if you have no energy. · Eating too much or too little. · POSTPARTUM SUPPORT INTERNATIONAL (PSI) offers a Warm line; Chat with the Expert phone sessions; Information and Articles about Pregnancy and Postpartum Mood Disorders; Comprehensive List of Free Support Groups; Knowledgeable local coordinators who will offer support, information, and resources; Guide to Resources on [employer];  Calendar of events in the  mood disorders community; Latest News and Research; and [facility] [address] for [employer]. Remember - You are not alone; You are not to blame; With help, you will be well. 4-252-372-PPD(8919). WWW. POSTPARTUM. NET   · Writing or talking about death, such as writing suicide notes or talking about guns, knives, or pills. Keep the numbers for these national suicide hotlines: 6-762-110-TALK (9-897.235.8223) and 6-120-PBETDEU (0-372.825.1962). If you or someone you know talks about suicide or feeling hopeless, get help right away. Constipation and Hemorrhoids  · Drink plenty of fluids, enough so that your urine is light yellow or clear like water. If you have kidney, heart, or liver disease and have to limit fluids, talk with your doctor before you increase the amount of fluids you drink. · Eat plenty of fiber each day. Have a bran muffin or bran cereal for breakfast, and try eating a piece of fruit for a mid-afternoon snack. · For painful, itchy hemorrhoids, put ice or a cold pack on the area several times a day for 10 minutes at a time. Follow this by putting a warm compress on the area for another 10 to 20 minutes or by sitting in a shallow, warm bath. When should you call for help? Call 911 anytime you think you may need emergency care. For example, call if:  · You are thinking of hurting yourself, your baby, or anyone else. · You passed out (lost consciousness). · You have symptoms of a blood clot in your lung (called a pulmonary embolism). These may include:    · Sudden chest pain. · Trouble breathing. · Coughing up blood. Call your doctor now or seek immediate medical care if:  · You have severe vaginal bleeding. · You are soaking through a pad each hour for 2 or more hours. · Your vaginal bleeding seems to be getting heavier or is still bright red 4 days after delivery. · You are dizzy or lightheaded, or you feel like you may faint.   · You are vomiting or cannot keep fluids down. · You have a fever. · You have new or more belly pain. · You pass tissue (not just blood). · Your vaginal discharge smells bad. · Your belly feels tender or full and hard. · Your breasts are continuously painful or red. · You feel sad, anxious, or hopeless for more than a few days. · You have sudden, severe pain in your belly. · You have symptoms of a blood clot in your leg (called a deep vein thrombosis),          such as:  · Pain in your calf, back of the knee, thigh, or groin. · Redness and swelling in your leg or groin. · You have symptoms of preeclampsia, such as:  · Sudden swelling of your face, hands, or feet. · New vision problems (such as dimness or blurring). · A severe headache. · Your blood pressure is higher than it should be or rises suddenly. · You have new nausea or vomiting. Watch closely for changes in your health, and be sure to contact your doctor if you have any problems. Additional Information:  Learning About Hypertensive Disorders After Childbirth    What is preeclampsia? A woman with preeclampsia has blood pressure that is higher than usual. She may also have other serious symptoms. Preeclampsia can be dangerous. When it is severe, it can cause seizures (eclampsia) or liver or kidney damage. When the liver is affected, some women get HELLP syndrome, a blood-clotting and bleeding problem. HELLP can come on quickly and can be deadly. This is why your doctor checks you and your baby often. Preeclampsia usually occurs after 20 weeks of pregnancy. In rare cases, it is first noted right after childbirth. Most often, it starts near the end of pregnancy and goes away after childbirth. What are the symptoms? Mild preeclampsia usually doesn't cause symptoms. But preeclampsia can cause rapid weight gain and sudden swelling of the hands and face. Severe preeclampsia does cause symptoms.  It can cause a very bad headache and trouble seeing and breathing. It also can cause belly pain. You may also urinate less than usual.    If you have new preeclampsia symptoms after you go home from the hospital, call your doctor right away. What can you expect after you have had preeclampsia? In the hospital  After the baby and the placenta are delivered, preeclampsia usually starts to improve. Most women get better in the first few days after childbirth. After having preeclampsia, you still have a risk of seizures for a day or more after childbirth. (Very rarely, seizures happen later on.) So your doctor may have you take magnesium sulfate for a day or more to prevent seizures. You may also take medicine to lower your blood pressure. When you go home  Your blood pressure will most likely return to normal a few days after delivery. Your doctor will want to check your blood pressure sometime in the first week after you leave the hospital.    Some women still have high blood pressure 6 weeks after childbirth. But most return to normal levels over the long term. · Take and record your blood pressure at home if your doctor tells you to. · Learn the importance of the two measures of blood pressure (such as 120 over 80, or 120/80). The first number is the systolic pressure. This is the force of blood on the artery walls as the heart pumps. The second number is the diastolic pressure. This is the force of blood on the artery walls between heartbeats, when the heart is at rest. You have a choice of monitors to use. Manual monitor: You pump up the cuff and use a stethoscope to listen for your  Pulse. · Electronic monitor: The cuff inflates, and a gauge shows your pulse rate. · To take your blood pressure:  · Ask your doctor to check your blood pressure monitor to be sure that it is accurate and that the cuff fits you. Also ask your doctor to watch you use it, to make sure that you are using it right.   · You should not eat, use tobacco products, or use medicine known to raise blood pressure (such as some nasal decongestant sprays) before you take your blood pressure. · Avoid taking your blood pressure if you have just exercised or are nervous or upset. Rest at least 15 minutes before you take your blood pressure. · Be safe with medicines. If you take medicine, take it exactly as prescribed. Call your doctor if you think you are having a problem with your medicine. · Do not smoke. Quitting smoking will help lower your blood pressure and improve your baby's growth and health. If you need help quitting, talk to your doctor about stop-smoking programs and medicines. These can increase your chances of quitting for good. · Eat a balanced and healthy diet that has lots of fruits and vegetables. Long-term health   After you have had preeclampsia, you have a higher-than-average risk of heart disease, stroke, and kidney disease. This may be because the same things that cause preeclampsia also cause heart and kidney disease. To protect your health, work with your doctor on living a heart-healthy lifestyle and getting the checkups you need. Your doctor may also want you to check your blood pressure at home. Follow-up care is a key part of your treatment and safety. Be sure to make and go to all appointments, and call your doctor if you are having problems. It's also a good idea to know your test results and keep a list of the medicines you take. Preventing Infection at Home    We care about preventing infection and avoiding the spread of germs - not only when you are in the hospital but also when you return home. When you return home from the hospital, it's important to take the following steps to help prevent infection and avoid spreading germs that could infect you and others. Ask everyone in your home to follow these guidelines, too.     Clean Your Hands  · Clean your hands whenever your hands are visibly dirty, before you eat, before or after touching your mouth, nose or eyes, and before preparing food. Clean them after contact with body fluids, using the restroom, touching animals or changing diapers. · When washing hands, wet them with warm water and work up a lather. Rub hands for at least 15 seconds, then rinse them and pat them dry with a clean towel or paper towel. · When using hand sanitizers, it should take about 15 seconds to rub your hands dry. If not, you probably didn't apply enough . Cover Your Sneeze or Cough  Germs are released into the air whenever you sneeze or cough. To prevent the spread of infection:  · Turn away from other people before coughing or sneezing. · Cover your mouth or nose with a tissue when you cough or sneeze. Put the tissue in the trash. · If you don't have a tissue, cough or sneeze into your upper sleeve, not your hands. · Always clean your hands after coughing or sneezing. Care for Wounds  Your skin is your body's first line of defense against germs, but an open wound leaves an easy way for germs to enter your body. To prevent infection:  · Clean your hands before and after changing wound dressings, and wear gloves to change dressings if recommended by your doctor. · Take special care with IV lines or other devices inserted into the body. If you must touch them, clean your hands first.  · Follow any specific instructions from your doctor to care for your wounds. Contact your doctor if you experience any signs of infection, such as fever or increased redness at the surgical or wound site. Keep a Clean Home  · Clean or wipe commonly touched hard surfaces like door handles, sinks, tabletops, phones and TV remotes. · Use products labeled \"disinfectant\" to kill harmful bacteria and viruses. · Use a clean cloth or paper towel to clean and dry surfaces. Wiping surfaces with a dirty dishcloth, sponge or towel will only spread germs.   · Never share toothbrushes, villegas, drinking glasses, utensils, razor blades, face cloths or bath towels to avoid spreading germs. · Be sure that the linens that you sleep on are clean. · Keep pets away from wounds and wash your hands after touching pets, their toys or bedding. We care about you and your health. Remember, preventing infections is a   team effort between you, your family, friends and health care providers. Postpartum Support    PARENTS:  Are you feeling sad or depressed? Is it difficult for you to enjoy yourself? Do you feel more irritable or tense? Do you feel anxious or panicky? Are you having difficulty bonding with your baby? Do you feel as if you are \"out of control\" or \"going crazy\"? Are you worried that you might hurt your baby or yourself? FAMILIES: Do you worry that something is wrong but don't know how to help? Do you think that your partner or spouse is having problems coping? Are you worried that it may never get better? While many women experience some mild mood change or \"the blues\" during or after the birth of a child, 1 in 9 women experience more significant symptoms of depression or anxiety. 1 in 10 Dads become depressed during the first year. Things you can do  Being a good parent includes taking care of yourself. If you take care of yourself, you will be able to take better care of your baby and your family. · Talk to a counselor or healthcare provider who has training in  mood and anxiety problems. · Learn as much as you can about pregnancy and postpartum depression and anxiety. · Get support from family and friends. Ask for help when you need it. · Join a support group in your area or online. · Keep active by walking, stretching or whatever form of exercise helps you to feel better. · Get enough rest and time for yourself. · Eat a healthy diet. · Don't give up! It may take more than one try to get the right help you need.        These are general instructions for a healthy lifestyle:    No smoking/ No tobacco products/ Avoid exposure to second hand smoke    Surgeon General's Warning:  Quitting smoking now greatly reduces serious risk to your health. Obesity, smoking, and sedentary lifestyle greatly increases your risk for illness    A healthy diet, regular physical exercise & weight monitoring are important for maintaining a healthy lifestyle    Recognize signs and symptoms of STROKE:    F-face looks uneven    A-arms unable to move or move unevenly    S-speech slurred or non-existent    T-time-call 911 as soon as signs and symptoms begin - DO NOT go       back to bed or wait to see if you get better - TIME IS BRAIN. I have had the opportunity to make my options or choices for discharge. I have received and understand these instructions.

## 2022-01-01 NOTE — PROGRESS NOTES
[address]   Office [phone], Fax [phone]                                Post-Partum Day Number 1 Progress Note    Patient doing well post-partum without significant complaint. Pain well controlled. Lochia controlled. Tolerating  diet. Ambulating. Voiding without difficulty. Passing flatus. Had BM. Vitals:    Patient Vitals for the past 8 hrs:   BP Temp Pulse Resp SpO2   22 0230 (!) 94/51 98 °F (36.7 °C) 67 16 99 %     Temp (24hrs), Av.1 °F (36.7 °C), Min:98 °F (36.7 °C), Max:98.1 °F (36.7 °C)      Exam:  Patient without distress. CTAB               RRR    Abdomen soft, fundus firm at level of umbilicus, nontender. Lower extremities:  Mild edema bilaterally. No calf tenderness. Assessment and Plan:    Mick Lassiter is a 31 y.o.  PPD2 s/p spontaneous  at 39 weeks 0 days. Pregnancy was complicated by GBS+, depression, RLE calf tenderness (w/ neg Doppler), and lacunar space under placenta with uncertain significance. Patient appears to be having uncomplicated post-partum course. PPD1 s/p  at 39w0d   - Continue routine perineal care and maternal education.    - Plan for discharge today if no problems occur. GBS + : S/p treatment with PCN     Depression: Stable on Zoloft 150mg. EPDS score 9.  - Continue Zoloft 150mg daily  - Follow-up outpatient     RLE calf tenderness and prominent varicosities: US negative for DVT outpatient. No calf tenderness currently.   - Monitor    Patient will be discussed with Dr. Geraldine Gorman MD  Family Medicine Resident

## 2022-02-01 NOTE — PROGRESS NOTES
Assumed care of patient, was admitted for labor this morning  Contractions are still strong in intensity per patient.       SVE: unchanged  FHT: 125, mod to marked aditya, +accels, no decels  TOCO: just got put back on monitor and has had 1 contraction in 5 min   EFW 7.5#    Plan:  37yo  @ 39w0d here for labor in setting of prior CS and  x3  Will plan to rupture patient  Likely Pitocin + IUPC (she has always needed pitocin augmentation with her prior VBACs)  GBS ppx  Anticipate  I have reviewed the history and physical note and findings

## 2022-03-18 PROBLEM — K58.1 IRRITABLE BOWEL SYNDROME WITH CONSTIPATION: Status: ACTIVE | Noted: 2017-11-09

## 2022-03-19 PROBLEM — N85.A UTERINE SCAR FROM PREVIOUS CESAREAN DELIVERY: Status: ACTIVE | Noted: 2021-12-30

## 2022-04-01 DIAGNOSIS — F41.9 ANXIETY AND DEPRESSION: ICD-10-CM

## 2022-04-01 DIAGNOSIS — F32.A ANXIETY AND DEPRESSION: ICD-10-CM

## 2022-04-05 RX ORDER — SERTRALINE HYDROCHLORIDE 100 MG/1
TABLET, FILM COATED ORAL
Qty: 60 TABLET | Refills: 0 | Status: SHIPPED | OUTPATIENT
Start: 2022-04-05 | End: 2022-04-06 | Stop reason: SDUPTHER

## 2022-04-05 NOTE — TELEPHONE ENCOUNTER
Will provide emergency refill(s) with expectation that patient follows up for anxiety and depression.

## 2022-04-06 ENCOUNTER — TELEPHONE (OUTPATIENT)
Dept: FAMILY MEDICINE CLINIC | Age: 37
End: 2022-04-06

## 2022-04-06 DIAGNOSIS — F41.9 ANXIETY AND DEPRESSION: ICD-10-CM

## 2022-04-06 DIAGNOSIS — F32.A ANXIETY AND DEPRESSION: ICD-10-CM

## 2022-04-06 RX ORDER — SERTRALINE HYDROCHLORIDE 100 MG/1
100 TABLET, FILM COATED ORAL DAILY
Qty: 90 TABLET | Refills: 2 | Status: SHIPPED | OUTPATIENT
Start: 2022-04-06

## 2022-04-06 NOTE — TELEPHONE ENCOUNTER
Received in basket from Dr. Harini Weiss:    Please schedule telemed or in clinic apt to discuss anxiety and depression. No more refills will be given until patient is seen by provider    I called pt to get scheduled but no answer and she does not have a vm. I will try to reach her on mychart.

## 2022-04-18 ENCOUNTER — TELEPHONE (OUTPATIENT)
Dept: FAMILY MEDICINE CLINIC | Age: 37
End: 2022-04-18

## 2022-04-18 NOTE — TELEPHONE ENCOUNTER
Hermes Sprague, pt called and confirming that she can do an appt on wed 4/20 at 11 am  Please and thank you      -stacy

## 2022-04-20 ENCOUNTER — OFFICE VISIT (OUTPATIENT)
Dept: FAMILY MEDICINE CLINIC | Age: 37
End: 2022-04-20

## 2022-04-20 VITALS
WEIGHT: 140 LBS | BODY MASS INDEX: 25.61 KG/M2 | HEART RATE: 69 BPM | SYSTOLIC BLOOD PRESSURE: 94 MMHG | DIASTOLIC BLOOD PRESSURE: 59 MMHG | OXYGEN SATURATION: 98 % | RESPIRATION RATE: 16 BRPM

## 2022-04-20 DIAGNOSIS — N81.10 FEMALE BLADDER PROLAPSE: Primary | ICD-10-CM

## 2022-04-20 PROCEDURE — 99213 OFFICE O/P EST LOW 20 MIN: CPT | Performed by: FAMILY MEDICINE

## 2022-04-20 NOTE — PROGRESS NOTES
Subjective:   Jluis Valdovinos is a 31 y.o. female who presents with concerns about possible prolapse:        Achy pelvic pressure when laying down, could see a bulge when straining on the toilet, had a foul odor with discharge for awhile but that resolved, went to a local provider when she lives and was told she has a prolapse. Currently feels irritated. Some trouble holding urine, can't do anything strenuous like jump on trampoline with her kids, coughing and sneezing can cause her to leak, no fecal incontience. Denies urgency. Sometimes has sensation of incomplete emptying. Sex is okay but sometimes has a irritating feel the next day. Allergies- reviewed:   No Known Allergies      Medications- reviewed:   Current Outpatient Medications   Medication Sig    sertraline (ZOLOFT) 100 mg tablet Take 1 Tablet by mouth daily.  ibuprofen (MOTRIN) 800 mg tablet Take 1 Tablet by mouth every eight (8) hours as needed for Pain.  magnesium 250 mg tab Take  by mouth. No current facility-administered medications for this visit.          Past Medical History- reviewed:  Past Medical History:   Diagnosis Date    IBS (irritable bowel syndrome)     Infertility     Infertility, female     Psychiatric problem     depression on meds now, anorexia in past         Past Surgical History- reviewed:   Past Surgical History:   Procedure Laterality Date    HX HEMORRHOIDECTOMY      [place]  DELIV[place] ONLY       for macrosomia()         Social History- reviewed:  Social History     Socioeconomic History    Marital status:      Spouse name: Not on file    Number of children: Not on file    Years of education: Not on file    Highest education level: Not on file   Occupational History    Not on file   Tobacco Use    Smoking status: Never Smoker    Smokeless tobacco: Never Used   Vaping Use    Vaping Use: Never used   Substance and Sexual Activity    Alcohol use: No    Drug use: No    Sexual activity: Yes     Partners: Male     Birth control/protection: None   Other Topics Concern     Service Not Asked    Blood Transfusions Not Asked    Caffeine Concern Not Asked    Occupational Exposure Not Asked    Hobby Hazards Not Asked    Sleep Concern Not Asked    Stress Concern Not Asked    Weight Concern Not Asked    Special Diet Not Asked    Back Care Not Asked    Exercise Not Asked    Bike Helmet Not Asked   [address] Not Asked    Self-Exams Not Asked   Social History Narrative    Not on file     Social Determinants of Health     Financial Resource Strain:     Difficulty of Paying Living Expenses: Not on file   Food Insecurity:     Worried About Running Out of Food in the Last Year: Not on file    Nelsy of Food in the Last Year: Not on file   Transportation Needs:     Lack of Transportation (Medical): Not on file    Lack of Transportation (Non-Medical):  Not on file   Physical Activity:     Days of Exercise per Week: Not on file    Minutes of Exercise per Session: Not on file   Stress:     Feeling of Stress : Not on file   Social Connections:     Frequency of Communication with Friends and Family: Not on file    Frequency of Social Gatherings with Friends and Family: Not on file    Attends Buddhist Services: Not on file    Active Member of [address] [place] [address] [employer] or Organizations: Not on file    Attends Club or Organization Meetings: Not on file    Marital Status: Not on file   Intimate Partner Violence:     Fear of Current or Ex-Partner: Not on file    Emotionally Abused: Not on file    Physically Abused: Not on file    Sexually Abused: Not on file   Housing Stability:     Unable to Pay for Housing in the Last Year: Not on file    Number of [place] in the Last Year: Not on file    Unstable Housing in the Last Year: Not on file         Review of Systems  General: No fevers or chills  GI: No abdominal pain, nausea, or vomiting  : No dysuria  MSK: No joint pain      Physical Exam     Visit Vitals  BP (!) 94/59 (BP 1 Location: Left upper arm, BP Patient Position: Sitting, BP Cuff Size: Adult)   Pulse 69   Resp 16   Wt 140 lb (63.5 kg)   SpO2 98%   BMI 25.61 kg/m²       General: No apparent distress. Alert and oriented. Responds to all questions appropriately. Abdomen: Soft; nontender; nondistended; no masses or organomegaly. : Normal external female genitalia, no lesions. Speculum exam: thin, clear discharge; cervix is non-friable. Bimanual exam: no cervical motion tenderness; no adnexal mass or tenderness bilaterally; uterus is non-tender and normal size. With straining there is bladder prolapse, not external.  Without straining there is mild urethral prolapse. On bimanual I can feel a mild bulge posterior vagina c/w possible mild rectal prolapse. Assessment/Plan:       ICD-10-CM ICD-9-CM    1. Female bladder prolapse  N81.10 618.01             PLAN:   Discussed options for treatment including kegals, pelvic PT, referral to urogyn. Pt opts for conservative management for now with home kegals. Talked about importance of regular and frequent kegals to make a difference. No orders of the defined types were placed in this encounter. I have discussed the diagnosis with the patient and the intended plan as seen in the above orders. The patient has received an after-visit summary and questions were answered concerning future plans. I have discussed medication side effects and warnings with the patient as well.     Jesse Wu, DO

## 2022-11-30 DIAGNOSIS — F41.9 ANXIETY AND DEPRESSION: ICD-10-CM

## 2022-11-30 DIAGNOSIS — F32.A ANXIETY AND DEPRESSION: ICD-10-CM

## 2022-11-30 RX ORDER — SERTRALINE HYDROCHLORIDE 100 MG/1
TABLET, FILM COATED ORAL
Qty: 90 TABLET | Refills: 0 | Status: SHIPPED | OUTPATIENT
Start: 2022-11-30

## 2022-11-30 RX ORDER — SERTRALINE HYDROCHLORIDE 100 MG/1
100 TABLET, FILM COATED ORAL DAILY
Qty: 90 TABLET | Refills: 2 | OUTPATIENT
Start: 2022-11-30

## 2023-01-30 DIAGNOSIS — F41.9 ANXIETY AND DEPRESSION: ICD-10-CM

## 2023-01-30 DIAGNOSIS — F32.A ANXIETY AND DEPRESSION: ICD-10-CM

## 2023-02-03 RX ORDER — SERTRALINE HYDROCHLORIDE 100 MG/1
100 TABLET, FILM COATED ORAL DAILY
Qty: 90 TABLET | Refills: 0 | OUTPATIENT
Start: 2023-02-03

## 2023-03-24 ENCOUNTER — VIRTUAL VISIT (OUTPATIENT)
Dept: FAMILY MEDICINE CLINIC | Age: 38
End: 2023-03-24

## 2023-03-24 DIAGNOSIS — F32.A ANXIETY AND DEPRESSION: ICD-10-CM

## 2023-03-24 DIAGNOSIS — F41.9 ANXIETY AND DEPRESSION: ICD-10-CM

## 2023-03-24 RX ORDER — SERTRALINE HYDROCHLORIDE 100 MG/1
150 TABLET, FILM COATED ORAL DAILY
Qty: 135 TABLET | Refills: 3 | Status: SHIPPED | OUTPATIENT
Start: 2023-03-24

## 2023-03-24 NOTE — PROGRESS NOTES
Hardik Boyer is an 30 y.o. female who presents for:    Depression: medication working well, winter is always tough, oldest who is 15 is dealing with depression as well, which triggers pt's depression, but overall in a good place now     Visit done on telephone. Allergies - reviewed:   No Known Allergies      Medications - reviewed:   Current Outpatient Medications   Medication Sig    sertraline (ZOLOFT) 100 mg tablet Take 1.5 Tablets by mouth daily. ibuprofen (MOTRIN) 800 mg tablet Take 1 Tablet by mouth every eight (8) hours as needed for Pain.    magnesium 250 mg tab Take  by mouth. No current facility-administered medications for this visit.          Past Medical History - reviewed:  Past Medical History:   Diagnosis Date    IBS (irritable bowel syndrome)     Infertility     Infertility, female     Psychiatric problem     depression on meds now, anorexia in past         Past Surgical History - reviewed:   Past Surgical History:   Procedure Laterality Date    HX HEMORRHOIDECTOMY      [place]  DELIVERY ONLY       for macrosomia()         Social History - reviewed:  Social History     Socioeconomic History    Marital status:      Spouse name: Not on file    Number of children: Not on file    Years of education: Not on file    Highest education level: Not on file   Occupational History    Not on file   Tobacco Use    Smoking status: Never    Smokeless tobacco: Never   Vaping Use    Vaping Use: Never used   Substance and Sexual Activity    Alcohol use: No    Drug use: No    Sexual activity: Yes     Partners: Male     Birth control/protection: None   Other Topics Concern     Service Not Asked    Blood Transfusions Not Asked    Caffeine Concern Not Asked    Occupational Exposure Not Asked    Hobby Hazards Not Asked    Sleep Concern Not Asked    Stress Concern Not Asked    Weight Concern Not Asked    Special Diet Not Asked    Back Care Not Asked    Exercise Not Asked Bike Helmet Not Asked    Seat Belt Not Asked    Self-Exams Not Asked   Social History Narrative    Not on file     Social Determinants of Health     Financial Resource Strain: Not on file   Food Insecurity: Not on file   Transportation Needs: Not on file   Physical Activity: Not on file   Stress: Not on file   Social Connections: Not on file   Intimate Partner Violence: Not on file   Housing Stability: Not on file         Family History - reviewed:  Family History   Problem Relation Age of Onset    Asthma Brother     Hypertension Maternal Grandfather          ROS  PSYCH: anxiety, depression       Physical Exam  There were no vitals taken for this visit. Normal speech  Normal mood and affect       Assessment/Plan    ICD-10-CM ICD-9-CM    1. Anxiety and depression  F41.9 300.00 sertraline (ZOLOFT) 100 mg tablet    F32. A 311         Continue current dose of Rx, refills sent       I have discussed the diagnosis with the patient and the intended plan as seen in the above orders. The patient has received an after-visit summary and questions were answered concerning future plans. I have discussed medication side effects and warnings with the patient as well.       Jesse Wu, DO

## 2023-04-12 NOTE — ANESTHESIA PROCEDURE NOTES
Epidural Block    Patient location during procedure: OB  Start time: 12/30/2021 10:02 AM  End time: 12/30/2021 10:09 AM  Reason for block: labor epidural  Staffing  Performed: CRNA   Anesthesiologist: Mi Bui MD  Resident/CRNA: Casimiro Newell CRNA  Preanesthetic Checklist  Completed: patient identified, IV checked, risks and benefits discussed, monitors and equipment checked, pre-op evaluation and timeout performed  Block Placement  Patient position: sitting  Prep: DuraPrep  Sterility prep: cap, drape, mask and gloves  Sedation level: no sedation  Patient monitoring: continuous pulse oximetry, ETCO2 and heart rate  Approach: midline  Location: lumbar  Lumbar location: L3-L4  Epidural  Loss of resistance technique: air  Guidance: landmark technique  Needle  Needle type: Tuohy   Needle gauge: 17 G  Needle length: 10 cm  Needle insertion depth: 7 cm  Catheter size: 20 G  Catheter at skin depth: 12 cm  Catheter securement method: clear occlusive dressing and surgical tape  Test dose: negative  Assessment  Number of attempts: 1  Additional Notes  Patient tolerated procedure well.  No stated complaints Klisyri Pregnancy And Lactation Text: It is unknown if this medication can harm a developing fetus or if it is excreted in breast milk.

## 2023-10-17 NOTE — PROGRESS NOTES
L3-S1 PSF, L5-S1 TLIF  See op note Post-Partum Day Number 2 Progress/Discharge Note Patient doing well post-partum without significant complaint. Pain well controlled. Lochia minimal.  Tolerating PO diet. Ambulating. Voiding without difficulty. + flatus. no BM. Vitals:   
Patient Vitals for the past 8 hrs: 
 BP Temp Pulse Resp  
19 0525 98/56 96.4 °F (35.8 °C) (!) 58 18 Temp (24hrs), Av.5 °F (36.4 °C), Min:96.4 °F (35.8 °C), Max:98.1 °F (36.7 °C) Vital signs stable, afebrile. Exam:  Patient without distress. CTAB, no w/r/r/c 
             RRR, + S1 and S2, no m/r/g Abdomen soft, fundus firm at level of umbilicus, non tender Perineum with normal lochia noted. Lower extremities are negative for swelling, cords or tenderness. Lab/Data Review: No results found for this or any previous visit (from the past 12 hour(s)). Assessment and Plan:   
 
Cathryn Rouse is a 34 y.o.  s/p  at 36 weeks 4 days. Patient appears to be having uncomplicated post-partum course. 1. Continue routine perineal care and maternal education. 2. Plan discharge for today with follow up in  office in 6 weeks. 3. Depression with anxiety. Continue Zoloft 100mg daily Patient seen and discussed with Dr. Maral Lin (Attending) Erendira Dacosta MD 
Family Medicine Resident

## 2024-04-09 RX ORDER — SERTRALINE HYDROCHLORIDE 100 MG/1
TABLET, FILM COATED ORAL
Qty: 45 TABLET | Refills: 1 | Status: SHIPPED | OUTPATIENT
Start: 2024-04-09

## 2024-04-09 NOTE — TELEPHONE ENCOUNTER
Pt called to check on the status of the Rx refill. She stated that she is out of this medication and need it to be filled asap.     Thank you

## 2024-05-20 ENCOUNTER — TELEMEDICINE (OUTPATIENT)
Age: 39
End: 2024-05-20

## 2024-05-20 DIAGNOSIS — F41.8 DEPRESSION WITH ANXIETY: ICD-10-CM

## 2024-05-20 DIAGNOSIS — F32.9 MAJOR DEPRESSIVE DISORDER WITH CURRENT ACTIVE EPISODE, UNSPECIFIED DEPRESSION EPISODE SEVERITY, UNSPECIFIED WHETHER RECURRENT: Primary | ICD-10-CM

## 2024-05-20 PROCEDURE — 99442 PR PHYS/QHP TELEPHONE EVALUATION 11-20 MIN: CPT | Performed by: FAMILY MEDICINE

## 2024-05-20 RX ORDER — SERTRALINE HYDROCHLORIDE 100 MG/1
TABLET, FILM COATED ORAL
Qty: 45 TABLET | Refills: 11 | Status: SHIPPED | OUTPATIENT
Start: 2024-05-20

## 2024-05-20 RX ORDER — SERTRALINE HYDROCHLORIDE 100 MG/1
TABLET, FILM COATED ORAL
Qty: 45 TABLET | Refills: 1 | Status: SHIPPED | OUTPATIENT
Start: 2024-05-20 | End: 2024-05-20

## 2024-05-20 NOTE — PROGRESS NOTES
Yoon Howe  38 y.o. female  1985  [address] [place] [id number]  [id number]   [facility]:    Telemedicine Progress Note  Flavio Walker DO       Encounter Date and Time: May 20, 2024 at 4:49 PM    Yoon Howe, was evaluated through a synchronous (real-time) audio-video encounter. The patient (or guardian if applicable) is aware that this is a billable service, which includes applicable co-pays. This Virtual Visit was conducted with patient's (and/or legal guardian's) consent. Patient identification was verified, and a caregiver was present when appropriate.   The patient was located at Home: [address] [place], [place] [id number]  Provider was located at Facility (Appt Dept): [address] [place] [phone]  Confirm you are appropriately licensed, registered, or certified to deliver care in the state where the patient is located as indicated above. If you are not or unsure, please re-schedule the visit: Yes, I confirm.     --Flavio Walker DO on 5/20/2024 at 4:49 PM    No chief complaint on file.    History of Present Illness   Yoon Howe is a 38 y.o. female was evaluated by synchronous (real-time) audio-video technology from her home, through a secure patient portal.    Life is busy and crazy, but mentally at much better place than before.  Feels emotions overall are stable, is wondering if she could come back down to 100mg daily from 150mg soon.  Doesn't want to quite yet with some changes in life coming up.     Review of Systems   Review of Systems      Vitals/Objective:     General: In no distress   Mental  status: mental status: alert, oriented to person, place, and time, normal mood, behavior, speech, dress, motor activity, and thought processes   Resp: No increased wob    Psych Normal mood and affect        Due to this being a TeleHealth evaluation, many elements of the physical examination are unable to be

## 2024-06-17 DIAGNOSIS — F32.9 MAJOR DEPRESSIVE DISORDER WITH CURRENT ACTIVE EPISODE, UNSPECIFIED DEPRESSION EPISODE SEVERITY, UNSPECIFIED WHETHER RECURRENT: ICD-10-CM

## 2024-06-18 RX ORDER — SERTRALINE HYDROCHLORIDE 100 MG/1
TABLET, FILM COATED ORAL
Qty: 45 TABLET | Refills: 11 | OUTPATIENT
Start: 2024-06-18

## 2024-09-05 DIAGNOSIS — F32.9 MAJOR DEPRESSIVE DISORDER WITH CURRENT ACTIVE EPISODE, UNSPECIFIED DEPRESSION EPISODE SEVERITY, UNSPECIFIED WHETHER RECURRENT: ICD-10-CM

## 2024-09-09 RX ORDER — SERTRALINE HYDROCHLORIDE 100 MG/1
TABLET, FILM COATED ORAL
Qty: 45 TABLET | Refills: 3 | Status: SHIPPED | OUTPATIENT
Start: 2024-09-09

## 2025-01-04 DIAGNOSIS — F32.9 MAJOR DEPRESSIVE DISORDER WITH CURRENT ACTIVE EPISODE, UNSPECIFIED DEPRESSION EPISODE SEVERITY, UNSPECIFIED WHETHER RECURRENT: ICD-10-CM

## 2025-01-07 RX ORDER — SERTRALINE HYDROCHLORIDE 100 MG/1
TABLET, FILM COATED ORAL
Qty: 45 TABLET | Refills: 1 | Status: SHIPPED | OUTPATIENT
Start: 2025-01-07

## 2025-03-01 DIAGNOSIS — F32.9 MAJOR DEPRESSIVE DISORDER WITH CURRENT ACTIVE EPISODE, UNSPECIFIED DEPRESSION EPISODE SEVERITY, UNSPECIFIED WHETHER RECURRENT: ICD-10-CM

## 2025-03-04 RX ORDER — SERTRALINE HYDROCHLORIDE 100 MG/1
TABLET, FILM COATED ORAL
Qty: 45 TABLET | Refills: 1 | Status: SHIPPED | OUTPATIENT
Start: 2025-03-04

## 2025-06-07 DIAGNOSIS — F32.9 MAJOR DEPRESSIVE DISORDER WITH CURRENT ACTIVE EPISODE, UNSPECIFIED DEPRESSION EPISODE SEVERITY, UNSPECIFIED WHETHER RECURRENT: ICD-10-CM

## 2025-06-09 RX ORDER — SERTRALINE HYDROCHLORIDE 100 MG/1
TABLET, FILM COATED ORAL
Qty: 45 TABLET | Refills: 0 | Status: SHIPPED | OUTPATIENT
Start: 2025-06-09

## 2025-07-20 SDOH — ECONOMIC STABILITY: INCOME INSECURITY: IN THE LAST 12 MONTHS, WAS THERE A TIME WHEN YOU WERE NOT ABLE TO PAY THE MORTGAGE OR RENT ON TIME?: NO

## 2025-07-20 SDOH — ECONOMIC STABILITY: FOOD INSECURITY: WITHIN THE PAST 12 MONTHS, THE FOOD YOU BOUGHT JUST DIDN'T LAST AND YOU DIDN'T HAVE MONEY TO GET MORE.: NEVER TRUE

## 2025-07-20 SDOH — ECONOMIC STABILITY: FOOD INSECURITY: WITHIN THE PAST 12 MONTHS, YOU WORRIED THAT YOUR FOOD WOULD RUN OUT BEFORE YOU GOT MONEY TO BUY MORE.: NEVER TRUE

## 2025-07-20 SDOH — ECONOMIC STABILITY: TRANSPORTATION INSECURITY
IN THE PAST 12 MONTHS, HAS LACK OF TRANSPORTATION KEPT YOU FROM MEETINGS, WORK, OR FROM GETTING THINGS NEEDED FOR DAILY LIVING?: NO

## 2025-07-20 SDOH — ECONOMIC STABILITY: TRANSPORTATION INSECURITY
IN THE PAST 12 MONTHS, HAS THE LACK OF TRANSPORTATION KEPT YOU FROM MEDICAL APPOINTMENTS OR FROM GETTING MEDICATIONS?: NO

## 2025-07-21 ENCOUNTER — TELEMEDICINE (OUTPATIENT)
Age: 40
End: 2025-07-21

## 2025-07-21 DIAGNOSIS — F32.9 MAJOR DEPRESSIVE DISORDER WITH CURRENT ACTIVE EPISODE, UNSPECIFIED DEPRESSION EPISODE SEVERITY, UNSPECIFIED WHETHER RECURRENT: ICD-10-CM

## 2025-07-21 PROCEDURE — 99212 OFFICE O/P EST SF 10 MIN: CPT | Performed by: FAMILY MEDICINE

## 2025-07-21 RX ORDER — SERTRALINE HYDROCHLORIDE 100 MG/1
TABLET, FILM COATED ORAL
Qty: 45 TABLET | Refills: 11 | Status: SHIPPED | OUTPATIENT
Start: 2025-07-21

## 2025-07-21 NOTE — PROGRESS NOTES
Yoon Howe  39 y.o. female  1985  [address] [place], [place] [id number]  [id number]    [phone] (home) [phone] (work)     [facility]:    Telephone Encounter  Flavio Walker DO       Encounter Date: 7/21/2025 at 4:43 PM    Yoon Howe was evaluated through a patient-initiated, synchronous (real-time) audio only encounter. She (or guardian if applicable) is aware that it is a billable service, which includes applicable co-pays, with coverage as determined by her insurance carrier. This visit was conducted with the patient's (and/or legan guardian's) verbal consent. She has not had a related appointment within my department in the past 7 days or scheduled within the next 24 hours. The patient was located in a state where the provider was licensed to provide care.  The patient was located at: Home: [address] [place] [address]  The provider was located at: Facility (Appt Dept): [address] [place] [phone]     No chief complaint on file.      History of Present Illness   Yoon Howe is a 39 y.o. female was evaluated by telephone.   I communicated with the patient and/or health care decision maker about:      History of depression: stable on Zoloft, has thought occasionally about reducing dose, but also feels better than she's felt in awhile in general, plus history of severe mental distress years ago.      Considering another pregnancy:  got a vasectomy, then a reversal, thought her last child would be her last, was very sick with her last pregnancy and felt done after, but now she and her  feel strongly they want to get pregnant again,  had vasectomy reversal 1 week ago.  After 4 months they will retest semen if not pregnant yet.  Still having regular cycles.  Never any trouble conceiving except took a little time with her first.      Review of Systems   Review of Systems      Vitals/Objective: